# Patient Record
Sex: FEMALE | Race: WHITE | NOT HISPANIC OR LATINO | Employment: FULL TIME | URBAN - METROPOLITAN AREA
[De-identification: names, ages, dates, MRNs, and addresses within clinical notes are randomized per-mention and may not be internally consistent; named-entity substitution may affect disease eponyms.]

---

## 2021-04-16 ENCOUNTER — PATIENT MESSAGE (OUTPATIENT)
Dept: RESEARCH | Facility: HOSPITAL | Age: 46
End: 2021-04-16

## 2022-02-09 ENCOUNTER — TELEPHONE (OUTPATIENT)
Dept: INTERNAL MEDICINE | Facility: CLINIC | Age: 47
End: 2022-02-09
Payer: COMMERCIAL

## 2022-02-09 NOTE — TELEPHONE ENCOUNTER
----- Message from Maegan Kearns sent at 2/9/2022 11:45 AM CST -----  Contact: Patient [phone]  [facility] state that they need a medical form fax to [phone] Attn: Record Department to send patient's records    Please call and advise

## 2022-02-09 NOTE — TELEPHONE ENCOUNTER
I spoke to pt, she have an NP appt scheduled with Dr. Hammonds on 2-16-22 and she is requesting records from [facility] to be sent prior to her visit.  I will fax pt record release form  to fill out.  Fax form to [phone]  Pt verbalized understanding

## 2022-02-14 LAB
LEFT EYE DM RETINOPATHY: NORMAL
RIGHT EYE DM RETINOPATHY: NORMAL

## 2022-02-15 ENCOUNTER — TELEPHONE (OUTPATIENT)
Dept: INTERNAL MEDICINE | Facility: CLINIC | Age: 47
End: 2022-02-15
Payer: COMMERCIAL

## 2022-02-15 NOTE — TELEPHONE ENCOUNTER
----- Message from Jenny Andrew sent at 2/15/2022  9:00 AM CST -----  Contact: Pt [phone]  Pt calling to see if you received her paper work from [facility] before her appt tomorrow.     Please call and advise

## 2022-02-15 NOTE — TELEPHONE ENCOUNTER
I spoke to pt, her records from [facility] are in.  Pt will keep her appt scheduled tomorrow at 1pm.

## 2022-02-16 ENCOUNTER — OFFICE VISIT (OUTPATIENT)
Dept: INTERNAL MEDICINE | Facility: CLINIC | Age: 47
End: 2022-02-16
Payer: COMMERCIAL

## 2022-02-16 ENCOUNTER — TELEPHONE (OUTPATIENT)
Dept: DIABETES | Facility: CLINIC | Age: 47
End: 2022-02-16
Payer: COMMERCIAL

## 2022-02-16 VITALS
BODY MASS INDEX: 37.63 KG/M2 | DIASTOLIC BLOOD PRESSURE: 66 MMHG | WEIGHT: 199.31 LBS | TEMPERATURE: 98 F | HEIGHT: 61 IN | OXYGEN SATURATION: 98 % | HEART RATE: 88 BPM | RESPIRATION RATE: 16 BRPM | SYSTOLIC BLOOD PRESSURE: 96 MMHG

## 2022-02-16 DIAGNOSIS — E11.9 TYPE 2 DIABETES MELLITUS WITHOUT COMPLICATION, WITHOUT LONG-TERM CURRENT USE OF INSULIN: ICD-10-CM

## 2022-02-16 DIAGNOSIS — Z00.00 PHYSICAL EXAM, ANNUAL: Primary | ICD-10-CM

## 2022-02-16 DIAGNOSIS — E03.9 HYPOTHYROIDISM, UNSPECIFIED TYPE: ICD-10-CM

## 2022-02-16 DIAGNOSIS — I10 HYPERTENSION, UNSPECIFIED TYPE: ICD-10-CM

## 2022-02-16 PROCEDURE — 90471 PNEUMOCOCCAL POLYSACCHARIDE VACCINE 23-VALENT =>2YO SQ IM: ICD-10-PCS | Mod: S$GLB,,, | Performed by: STUDENT IN AN ORGANIZED HEALTH CARE EDUCATION/TRAINING PROGRAM

## 2022-02-16 PROCEDURE — 90471 IMMUNIZATION ADMIN: CPT | Mod: S$GLB,,, | Performed by: STUDENT IN AN ORGANIZED HEALTH CARE EDUCATION/TRAINING PROGRAM

## 2022-02-16 PROCEDURE — 4010F ACE/ARB THERAPY RXD/TAKEN: CPT | Mod: CPTII,S$GLB,, | Performed by: STUDENT IN AN ORGANIZED HEALTH CARE EDUCATION/TRAINING PROGRAM

## 2022-02-16 PROCEDURE — 1159F PR MEDICATION LIST DOCUMENTED IN MEDICAL RECORD: ICD-10-PCS | Mod: CPTII,S$GLB,, | Performed by: STUDENT IN AN ORGANIZED HEALTH CARE EDUCATION/TRAINING PROGRAM

## 2022-02-16 PROCEDURE — 3078F PR MOST RECENT DIASTOLIC BLOOD PRESSURE < 80 MM HG: ICD-10-PCS | Mod: CPTII,S$GLB,, | Performed by: STUDENT IN AN ORGANIZED HEALTH CARE EDUCATION/TRAINING PROGRAM

## 2022-02-16 PROCEDURE — 3074F SYST BP LT 130 MM HG: CPT | Mod: CPTII,S$GLB,, | Performed by: STUDENT IN AN ORGANIZED HEALTH CARE EDUCATION/TRAINING PROGRAM

## 2022-02-16 PROCEDURE — 99999 PR PBB SHADOW E&M-EST. PATIENT-LVL V: CPT | Mod: PBBFAC,,, | Performed by: STUDENT IN AN ORGANIZED HEALTH CARE EDUCATION/TRAINING PROGRAM

## 2022-02-16 PROCEDURE — 4010F PR ACE/ARB THEARPY RXD/TAKEN: ICD-10-PCS | Mod: CPTII,S$GLB,, | Performed by: STUDENT IN AN ORGANIZED HEALTH CARE EDUCATION/TRAINING PROGRAM

## 2022-02-16 PROCEDURE — 3008F PR BODY MASS INDEX (BMI) DOCUMENTED: ICD-10-PCS | Mod: CPTII,S$GLB,, | Performed by: STUDENT IN AN ORGANIZED HEALTH CARE EDUCATION/TRAINING PROGRAM

## 2022-02-16 PROCEDURE — 99386 PR PREVENTIVE VISIT,NEW,40-64: ICD-10-PCS | Mod: 25,S$GLB,, | Performed by: STUDENT IN AN ORGANIZED HEALTH CARE EDUCATION/TRAINING PROGRAM

## 2022-02-16 PROCEDURE — 90732 PPSV23 VACC 2 YRS+ SUBQ/IM: CPT | Mod: S$GLB,,, | Performed by: STUDENT IN AN ORGANIZED HEALTH CARE EDUCATION/TRAINING PROGRAM

## 2022-02-16 PROCEDURE — 1159F MED LIST DOCD IN RCRD: CPT | Mod: CPTII,S$GLB,, | Performed by: STUDENT IN AN ORGANIZED HEALTH CARE EDUCATION/TRAINING PROGRAM

## 2022-02-16 PROCEDURE — 99999 PR PBB SHADOW E&M-EST. PATIENT-LVL V: ICD-10-PCS | Mod: PBBFAC,,, | Performed by: STUDENT IN AN ORGANIZED HEALTH CARE EDUCATION/TRAINING PROGRAM

## 2022-02-16 PROCEDURE — 3078F DIAST BP <80 MM HG: CPT | Mod: CPTII,S$GLB,, | Performed by: STUDENT IN AN ORGANIZED HEALTH CARE EDUCATION/TRAINING PROGRAM

## 2022-02-16 PROCEDURE — 3074F PR MOST RECENT SYSTOLIC BLOOD PRESSURE < 130 MM HG: ICD-10-PCS | Mod: CPTII,S$GLB,, | Performed by: STUDENT IN AN ORGANIZED HEALTH CARE EDUCATION/TRAINING PROGRAM

## 2022-02-16 PROCEDURE — 90732 PNEUMOCOCCAL POLYSACCHARIDE VACCINE 23-VALENT =>2YO SQ IM: ICD-10-PCS | Mod: S$GLB,,, | Performed by: STUDENT IN AN ORGANIZED HEALTH CARE EDUCATION/TRAINING PROGRAM

## 2022-02-16 PROCEDURE — 3008F BODY MASS INDEX DOCD: CPT | Mod: CPTII,S$GLB,, | Performed by: STUDENT IN AN ORGANIZED HEALTH CARE EDUCATION/TRAINING PROGRAM

## 2022-02-16 PROCEDURE — 99386 PREV VISIT NEW AGE 40-64: CPT | Mod: 25,S$GLB,, | Performed by: STUDENT IN AN ORGANIZED HEALTH CARE EDUCATION/TRAINING PROGRAM

## 2022-02-16 RX ORDER — EMPAGLIFLOZIN 25 MG/1
25 TABLET, FILM COATED ORAL ONCE
COMMUNITY
Start: 2022-02-04 | End: 2022-04-22 | Stop reason: SDUPTHER

## 2022-02-16 RX ORDER — LEVOTHYROXINE SODIUM 50 UG/1
50 TABLET ORAL DAILY
Qty: 90 TABLET | Refills: 1 | Status: SHIPPED | OUTPATIENT
Start: 2022-02-16 | End: 2022-08-16 | Stop reason: SDUPTHER

## 2022-02-16 RX ORDER — SPIRONOLACTONE 25 MG/1
12.5 TABLET ORAL DAILY
COMMUNITY
Start: 2021-11-17 | End: 2022-08-16 | Stop reason: SDUPTHER

## 2022-02-16 RX ORDER — GLIMEPIRIDE 2 MG/1
2 TABLET ORAL 3 TIMES DAILY
COMMUNITY
Start: 2021-12-13 | End: 2022-02-16 | Stop reason: SDUPTHER

## 2022-02-16 RX ORDER — GLIMEPIRIDE 4 MG/1
4 TABLET ORAL
Qty: 30 TABLET | Refills: 3 | Status: SHIPPED | OUTPATIENT
Start: 2022-02-16 | End: 2022-05-19

## 2022-02-16 RX ORDER — ATORVASTATIN CALCIUM 40 MG/1
40 TABLET, FILM COATED ORAL DAILY
COMMUNITY
Start: 2021-12-22 | End: 2022-06-20 | Stop reason: SDUPTHER

## 2022-02-16 RX ORDER — METOPROLOL SUCCINATE 25 MG/1
25 TABLET, EXTENDED RELEASE ORAL DAILY
COMMUNITY
Start: 2021-11-17 | End: 2022-02-24 | Stop reason: SDUPTHER

## 2022-02-16 RX ORDER — CALCIUM CITRATE/VITAMIN D3 200MG-6.25
TABLET ORAL 4 TIMES DAILY
COMMUNITY
Start: 2022-02-11 | End: 2022-05-16 | Stop reason: SDUPTHER

## 2022-02-16 NOTE — PROGRESS NOTES
Subjective:     Chief Complaint: Diabetes, Establish Care, Annual Exam, and Hypertension    HPI   Ms. Nahed Mcginnis is a 47 yo F presenting as a new patient to establish/transition primary care; of note, all prior care has taking place at  (Dr. Haydee galo)    Outside medical records received in preparation for this appointment in summarized as below:  -normal mammography () 08/31/2020  -normal chest x-ray 01/07/2020  -labs 5/18 showed total cholesterol of 233, triglycerides of 194, and LDL of 148, fasting blood sugar elevation (142)  and modest platelet count elevation (429); otherwise normal cell lines and kidney/liver function  - normal Pap smear 08/17/2020  - 03/10/2017:  Chest x-ray gathered with diagnosis of cough showed new nodular density left lung base with differential of super position artifact, inflammatory nodule, atelectasis, neoplasm, short-term follow-up chest x-ray is recommended.  If findings are persistent, further evaluation with CT would be suggested.  3/17:  Normal CT abdomen and pelvis    Diabetes:  -Currently taking:  Januvia 50, Jardiance 25 (started back on it 3 weeks ago), glimepiride 2 mg t.i.d. (?...almost out)   -Describing regular episodes hypoglycemia  -Most recent A1c: ?8ish?  -Takes  atorvastatin 40  -Denies diabetic neuropathy  -Most recent microalbumin:  Does not report ever having urine studies assessed  -Pneumovax:  Never offered  -Optho exam:  Has been seeing an ophthalmologist for contact lenses; does not report having ever having had a dilated eye exam  -Last foot exam:  Never    Hypertension:  - hydrochlorothiazide 12.5, Spironolactone 25, amlodipine 5, lisinopril 10, metoprolol succinate 25  - experiencing significant BP increases     Hypothyroidism:  - levothyroxine 50 (recently increased)    Family, social, surgical Hx reviewed     Health Maintenance:  Mammogram:  Overdue for repeat  Gyn exam:  Follows with outside gyn ([facility])  Colorectal CA screening: no  indication for early initiation.   Cholesterol:  Due for assessment  Vaccines:  Due for pneumovax 23 (diabetes)  Eye exam:  Due as above  Diet and Exercise: Discussed current recommendations     Past Medical History:   Diagnosis Date    DM (diabetes mellitus)     Hypertension     Hypothyroidism      Past Surgical History:   Procedure Laterality Date     SECTION      DILATION AND CURETTAGE OF UTERUS      TONSILLECTOMY       Family History   Problem Relation Age of Onset    Hypertension Mother     Heart disease Father      Social History     Socioeconomic History    Marital status:    Occupational History    Occupation: clerical     Employer: [employer]   Tobacco Use    Smoking status: Never Smoker    Smokeless tobacco: Never Used   Substance and Sexual Activity    Alcohol use: No    Drug use: No    Sexual activity: Yes     Partners: Male     Birth control/protection: OCP     Review of patient's allergies indicates:  No Known Allergies  Nahed Mcginnis had no medications administered during this visit.      Review of Systems   Constitutional: Negative for appetite change, chills and fever.   HENT: Negative.    Respiratory: Negative for cough, chest tightness and shortness of breath.    Cardiovascular: Negative for chest pain, palpitations and leg swelling.   Gastrointestinal: Negative for abdominal distention, abdominal pain, blood in stool, constipation, diarrhea, nausea and vomiting.   Endocrine: Negative.    Genitourinary: Negative for difficulty urinating, dysuria, frequency and hematuria.   Musculoskeletal: Negative.    Integumentary:  Negative.   Neurological: Negative.    Psychiatric/Behavioral: Negative.          Objective:      Vitals:    22 1301   BP: 96/66   Pulse: 88   Resp: 16   Temp: 97.5 °F (36.4 °C)      Physical Exam  Vitals reviewed.   Constitutional:       General: She is not in acute distress.     Appearance: Normal appearance.   HENT:      Head:  Normocephalic and atraumatic.      Comments: Facial features are symmetric      Nose: Nose normal. No congestion or rhinorrhea.      Mouth/Throat:      Mouth: Mucous membranes are moist.      Pharynx: Oropharynx is clear. No oropharyngeal exudate or posterior oropharyngeal erythema.   Eyes:      General: No scleral icterus.     Extraocular Movements: Extraocular movements intact.      Conjunctiva/sclera: Conjunctivae normal.   Cardiovascular:      Rate and Rhythm: Normal rate and regular rhythm.      Pulses: Normal pulses.      Heart sounds: Normal heart sounds.   Pulmonary:      Effort: Pulmonary effort is normal. No respiratory distress.      Breath sounds: Normal breath sounds.   Musculoskeletal:         General: No deformity or signs of injury. Normal range of motion.      Cervical back: Normal range of motion.      Comments: Gait normal    Skin:     General: Skin is warm and dry.      Findings: No rash.   Neurological:      General: No focal deficit present.      Mental Status: She is alert and oriented to person, place, and time. Mental status is at baseline.   Psychiatric:         Mood and Affect: Mood normal.         Behavior: Behavior normal.         Thought Content: Thought content normal.       Current Outpatient Medications on File Prior to Visit   Medication Sig Dispense Refill    amlodipine besylate (AMLODIPINE ORAL) Take 5 mg by mouth once daily.      atorvastatin (LIPITOR) 40 MG tablet Take 40 mg by mouth once daily.      hydrochlorothiazide (HYDRODIURIL) 12.5 MG Tab Take 12.5 mg by mouth once daily.      JARDIANCE 25 mg tablet Take 25 mg by mouth once.      lisinopril 10 MG tablet Take 20 mg by mouth once daily.      metoprolol succinate (TOPROL-XL) 25 MG 24 hr tablet Take 25 mg by mouth once daily.      omeprazole (PRILOSEC) 20 MG capsule Take 20 mg by mouth once daily.      SITagliptin (JANUVIA) 50 MG Tab Take 100 mg by mouth once daily.      spironolactone (ALDACTONE) 25 MG tablet Take  12.5 mg by mouth once daily.      TRUE METRIX GLUCOSE TEST STRIP Strp 4 (four) times daily.      [DISCONTINUED] glimepiride (AMARYL) 2 MG tablet Take 2 mg by mouth 3 (three) times daily. 2 tablets in the morning and 1 tablet in the evening      [DISCONTINUED] levothyroxine (SYNTHROID) 50 MCG tablet Take by mouth once daily.      acetaminophen (TYLENOL) 500 MG tablet Take 2 tablets (1,000 mg total) by mouth every 6 (six) hours as needed. (Patient not taking: Reported on 2/16/2022) 50 tablet 0    diclofenac sodium (VOLTAREN) 1 % Gel Apply 2 g topically 4 (four) times daily. (Patient not taking: Reported on 2/16/2022) 100 g 0    HYDROcodone-acetaminophen (NORCO) 7.5-325 mg per tablet Take 1 tablet by mouth every 8 (eight) hours as needed. 15 tablet 0    [DISCONTINUED] metoprolol succinate (TOPROL-XL) 100 MG 24 hr tablet Take 100 mg by mouth once daily.      [DISCONTINUED] ondansetron (ZOFRAN) 4 MG tablet Take 1 tablet (4 mg total) by mouth every 6 (six) hours. (Patient not taking: No sig reported) 12 tablet 0    [DISCONTINUED] pantoprazole (PROTONIX) 20 MG tablet Take 1 tablet (20 mg total) by mouth once daily. (Patient not taking: No sig reported) 30 tablet 0    [DISCONTINUED] promethazine (PHENERGAN) 25 MG suppository Place 1 suppository (25 mg total) rectally every 6 (six) hours as needed for Nausea. 10 suppository 0    [DISCONTINUED] SPIRONOLACTONE, BULK, MISC by Misc.(Non-Drug; Combo Route) route.      [DISCONTINUED] sucralfate (CARAFATE) 100 mg/mL suspension Take 10 mLs (1 g total) by mouth 4 (four) times daily before meals and nightly. 414 mL 0     No current facility-administered medications on file prior to visit.         Assessment:       1. Physical exam, annual    2. Type 2 diabetes mellitus without complication, without long-term current use of insulin    3. Hypertension, unspecified type    4. Hypothyroidism, unspecified type        Plan:       Physical exam, annual  -     CBC Auto  Differential; Future; Expected date: 02/16/2022  -     Comprehensive Metabolic Panel; Future; Expected date: 02/16/2022  -     Hemoglobin A1C; Future; Expected date: 02/16/2022  -     Hepatitis C Antibody; Future; Expected date: 02/16/2022  -     HIV 1/2 Ag/Ab (4th Gen); Future; Expected date: 02/16/2022  -     Lipid Panel; Future; Expected date: 02/16/2022  -     Mammo Digital Screening Bilat w/ Dominguez; Future; Expected date: 02/16/2022        -     TSH; Future; Expected date: 02/16/2022   - annual screening labs ordered   - vaccinations preferentially updated with PPVS 23    Type 2 diabetes mellitus without complication, without long-term current use of insulin  -     C-Peptide; Future; Expected date: 02/16/2022  -     Glutamic acid decarboxylase; Future; Expected date: 02/16/2022  -     Microalbumin/Creatinine Ratio, Urine; Future; Expected date: 02/16/2022  -     Ambulatory referral/consult to Diabetic Advanced Practice Providers (Medical Management); Future; Expected date: 02/23/2022  -     Ambulatory referral/consult to Diabetes Education; Future; Expected date: 02/23/2022   - I am perplexed by her current diabetic regimen, namely glimepiride 2 mg t.i.d. in the setting of recurrent hypoglycemia; I have changed this to 4 mg once daily with breakfast   - will facilitate establishment with both in office diabetic specialist & diabetic educator in addition to confirming type 2 diabetic status (C-peptide and SAVANNAH antibody); recommendations/interventions appreciated   - A1c and microalbumin to creatinine ratio to be gathered with annual labs as above; as she has recently re-initiated Jardiance 25 and will be decreasing total glimepiride dose from 6-4 mg daily, will reassess A1c in 3 months    Hypertension, unspecified type   - equally perplexed by antihypertensive regimen:  Will eliminate hydrochlorothiazide and amlodipine   - will continue with metoprolol and lisinopril monotherapy (preferentially increasing  lisinopril for nephroprotection)    Hypothyroidism, unspecified type   - she was never told to take this medication 1st thing in the morning on empty stomach; extensive conversation had regarding such   - will reassess TSH treat accordingly    Other orders  -     Pneumococcal Polysaccharide Vaccine (23 Valent) (SQ/IM)  -     levothyroxine (SYNTHROID) 50 MCG tablet; Take 1 tablet (50 mcg total) by mouth once daily.  Dispense: 90 tablet; Refill: 1  -     glimepiride (AMARYL) 4 MG tablet; Take 1 tablet (4 mg total) by mouth daily with breakfast. 2 tablets in the morning and 1 tablet in the evening  Dispense: 30 tablet; Refill: 3

## 2022-02-17 ENCOUNTER — TELEPHONE (OUTPATIENT)
Dept: INTERNAL MEDICINE | Facility: CLINIC | Age: 47
End: 2022-02-17
Payer: COMMERCIAL

## 2022-02-17 NOTE — TELEPHONE ENCOUNTER
----- Message from Anibal Garcia sent at 2/17/2022  2:26 PM CST -----  Contact: [phone]  Pt is calling about RX glimepiride (AMARYL) 4 MG tablet, she said the pharmacy called her and said the directions on the rx are incorrect, please call pt back, thanks     [id number] - [place] - [address] AT [facility] [address]  [address] [address]  Phone: [phone] Fax: [phone]

## 2022-02-24 ENCOUNTER — PATIENT MESSAGE (OUTPATIENT)
Dept: INTERNAL MEDICINE | Facility: CLINIC | Age: 47
End: 2022-02-24
Payer: COMMERCIAL

## 2022-02-24 RX ORDER — METOPROLOL SUCCINATE 25 MG/1
25 TABLET, EXTENDED RELEASE ORAL DAILY
Qty: 90 TABLET | Refills: 4 | Status: SHIPPED | OUTPATIENT
Start: 2022-02-24 | End: 2023-04-03 | Stop reason: SDUPTHER

## 2022-02-24 NOTE — TELEPHONE ENCOUNTER
----- Message from Paola Gupta sent at 2/24/2022 10:10 AM CST -----  Contact: Pt- [phone]  Requesting an RX refill or new RX.    Is this a refill or new RX: refill    RX name and strength (copy/paste from chart):  metoprolol succinate (TOPROL-XL) 25 MG 24 hr tablet    Is this a 30 day or 90 day RX: 30 day    Pharmacy name and phone # (copy/paste from chart):    [id number] - [place] - [address] [facility] [address]  [address] [address]  Phone: [phone] Fax: [phone]    The doctors have asked that we provide their patients with the following 2 reminders -- prescription refills can take up to 72 hours, and a friendly reminder that in the future you can use your [facility] account to request refills:  yes

## 2022-02-24 NOTE — TELEPHONE ENCOUNTER
Care Due:                  Date            Visit Type   Department     Provider  --------------------------------------------------------------------------------                                NP -                              PRIMARY      MET INTERNAL  Last Visit: 02-      CARE ([facility])   MEDICINE       Fausto Hammonds  Next Visit: None Scheduled  None         None Found                                                            Last  Test          Frequency    Reason                     Performed    Due Date  --------------------------------------------------------------------------------    Cr..........  12 months..  glimepiride..............  04- 04-    HBA1C.......  6 months...  glimepiride..............  Not Found    Overdue    TSH.........  12 months..  levothyroxine............  Not Found    Overdue    Powered by [employer]. Reference number: [id number].   2/24/2022 11:40:11 AM CST

## 2022-02-28 ENCOUNTER — TELEPHONE (OUTPATIENT)
Dept: DIABETES | Facility: CLINIC | Age: 47
End: 2022-02-28
Payer: COMMERCIAL

## 2022-03-10 ENCOUNTER — TELEPHONE (OUTPATIENT)
Dept: INTERNAL MEDICINE | Facility: CLINIC | Age: 47
End: 2022-03-10
Payer: COMMERCIAL

## 2022-03-10 NOTE — TELEPHONE ENCOUNTER
I spoke to pt, there was a bit of confusion, the diabetic educator told pt to contact PCP regarding changing of diabetic testing supplies.  Pt is schedule to see NP Khushbu on 5-19-22.  Pt was advised NP Khushbu will determine the best testing supplies needed and her staff will instruct pt on using this equipment.  Pt verbalized understanding.

## 2022-03-10 NOTE — TELEPHONE ENCOUNTER
----- Message from Karena Phelps sent at 3/10/2022  1:40 PM CST -----  Type: Patient Call Back         Who called:Patient          What is the request in detail:Patient called in regarding a message from the benito eduction .         Can the clinic reply by [facility]?no          Would the patient rather a call back or a response via [facility]? Call back          Best call back number:. [phone]         Additional Information:           Thank You

## 2022-03-22 ENCOUNTER — TELEPHONE (OUTPATIENT)
Dept: INTERNAL MEDICINE | Facility: CLINIC | Age: 47
End: 2022-03-22
Payer: COMMERCIAL

## 2022-03-22 ENCOUNTER — PATIENT MESSAGE (OUTPATIENT)
Dept: INTERNAL MEDICINE | Facility: CLINIC | Age: 47
End: 2022-03-22
Payer: COMMERCIAL

## 2022-03-22 NOTE — TELEPHONE ENCOUNTER
I spoke to pt, she is reporting redness to opposite eye. No pain, discomfort or itching noted.  Pt wears contact lens.She was advised to contact her eye dr.  Pt verbalized understanding

## 2022-03-22 NOTE — TELEPHONE ENCOUNTER
I tried calling pt to discuss mychart message sent .  No answer. Left VM for pt to call the clinic

## 2022-03-22 NOTE — TELEPHONE ENCOUNTER
----- Message from Jenny Andrew sent at 3/22/2022  3:46 PM CDT -----  Contact: Pt [phone]  Patient is returning a phone call.  Who left a message for the patient: Dominique Liriano,  Does patient know what this is regarding:  Portal message   Would you like a call back, or a response through your [facility] portal?:   call back   Comments:

## 2022-04-21 ENCOUNTER — PATIENT MESSAGE (OUTPATIENT)
Dept: INTERNAL MEDICINE | Facility: CLINIC | Age: 47
End: 2022-04-21
Payer: COMMERCIAL

## 2022-04-21 NOTE — TELEPHONE ENCOUNTER
Last OV 2-16-22    Pt requested Rx Jardiance, I tried calling pt to discuss her request. No answer. Left VM for pt to call the clinic tomorrow

## 2022-04-22 RX ORDER — EMPAGLIFLOZIN 25 MG/1
25 TABLET, FILM COATED ORAL ONCE
Qty: 30 TABLET | Refills: 2 | Status: SHIPPED | OUTPATIENT
Start: 2022-04-22 | End: 2022-04-22

## 2022-04-22 NOTE — TELEPHONE ENCOUNTER
I spoke to pt and she verified she is taking several Rx's for diabetes management.  She is requesting a 30 day refill of Jardiance 25 mg below.  Pt is scheduled to see SAVANAH Ríos on 5-19-22    Thanks    
No new care gaps identified.  Powered by [employer]. Reference number: [id number].   4/22/2022 9:31:26 AM CDT  
(0) independent

## 2022-05-04 ENCOUNTER — PATIENT MESSAGE (OUTPATIENT)
Dept: INTERNAL MEDICINE | Facility: CLINIC | Age: 47
End: 2022-05-04
Payer: COMMERCIAL

## 2022-05-04 DIAGNOSIS — E11.9 TYPE 2 DIABETES MELLITUS WITHOUT COMPLICATION, WITHOUT LONG-TERM CURRENT USE OF INSULIN: Primary | ICD-10-CM

## 2022-05-04 RX ORDER — EMPAGLIFLOZIN 25 MG/1
25 TABLET, FILM COATED ORAL DAILY
COMMUNITY
Start: 2022-04-22 | End: 2022-05-19

## 2022-05-04 NOTE — TELEPHONE ENCOUNTER
I spoke to pt, she is taking 3 meds for diabetes.  I updated her list. She is scheduled to see NP Khushbu on 5-19-22. Pt was advised to bring her med list/Rx bottles to her appt.    Pt is requesting the Rx below, thanks

## 2022-05-04 NOTE — TELEPHONE ENCOUNTER
Care Due:                  Date            Visit Type   Department     Provider  --------------------------------------------------------------------------------                                NP -                              PRIMARY      MET INTERNAL  Last Visit: 02-      CARE ([facility])   MEDICINE       Fausto Hammonds  Next Visit: None Scheduled  None         None Found                                                            Last  Test          Frequency    Reason                     Performed    Due Date  --------------------------------------------------------------------------------    Cr..........  12 months..  glimepiride..............  04- 04-    HBA1C.......  6 months...  glimepiride..............  Not Found    Overdue    TSH.........  12 months..  levothyroxine............  Not Found    Overdue    [employer] Embedded Care Gaps. Reference number: [id number]. 5/04/2022   8:43:45 AM CDT

## 2022-05-11 ENCOUNTER — PATIENT MESSAGE (OUTPATIENT)
Dept: INTERNAL MEDICINE | Facility: CLINIC | Age: 47
End: 2022-05-11
Payer: COMMERCIAL

## 2022-05-11 RX ORDER — ONDANSETRON 4 MG/1
8 TABLET, ORALLY DISINTEGRATING ORAL 2 TIMES DAILY PRN
COMMUNITY
End: 2022-05-11 | Stop reason: SDUPTHER

## 2022-05-11 RX ORDER — ONDANSETRON 4 MG/1
4 TABLET, ORALLY DISINTEGRATING ORAL 2 TIMES DAILY PRN
Qty: 60 TABLET | Refills: 1 | Status: SHIPPED | OUTPATIENT
Start: 2022-05-11

## 2022-05-11 NOTE — TELEPHONE ENCOUNTER
No new care gaps identified.  [facility] Embedded Care Gaps. Reference number: [id number]. 5/11/2022   11:37:19 AM CDT

## 2022-05-11 NOTE — TELEPHONE ENCOUNTER
Last OV 22    I spoke to pt, she is reporting nausea off and on for 1 week.  She tried taking tums and antacid with no relief.  Pt had a Rx for zofran that have .    Pt is requesting Rx refill, thanks

## 2022-05-16 ENCOUNTER — PATIENT MESSAGE (OUTPATIENT)
Dept: INTERNAL MEDICINE | Facility: CLINIC | Age: 47
End: 2022-05-16
Payer: COMMERCIAL

## 2022-05-16 ENCOUNTER — LAB VISIT (OUTPATIENT)
Dept: LAB | Facility: HOSPITAL | Age: 47
End: 2022-05-16
Attending: STUDENT IN AN ORGANIZED HEALTH CARE EDUCATION/TRAINING PROGRAM
Payer: COMMERCIAL

## 2022-05-16 ENCOUNTER — HOSPITAL ENCOUNTER (OUTPATIENT)
Dept: RADIOLOGY | Facility: HOSPITAL | Age: 47
Discharge: HOME | End: 2022-05-16
Attending: STUDENT IN AN ORGANIZED HEALTH CARE EDUCATION/TRAINING PROGRAM
Payer: COMMERCIAL

## 2022-05-16 VITALS — BODY MASS INDEX: 37.57 KG/M2 | WEIGHT: 199 LBS | HEIGHT: 61 IN

## 2022-05-16 DIAGNOSIS — Z00.00 PHYSICAL EXAM, ANNUAL: ICD-10-CM

## 2022-05-16 DIAGNOSIS — E11.9 TYPE 2 DIABETES MELLITUS WITHOUT COMPLICATION, WITHOUT LONG-TERM CURRENT USE OF INSULIN: Primary | ICD-10-CM

## 2022-05-16 LAB
ALBUMIN/CREAT UR: 4.3 UG/MG (ref 0–30)
CREAT UR-MCNC: 140 MG/DL (ref 15–325)
MICROALBUMIN UR DL<=1MG/L-MCNC: 6 UG/ML

## 2022-05-16 PROCEDURE — 77063 BREAST TOMOSYNTHESIS BI: CPT | Mod: TC,PO

## 2022-05-16 PROCEDURE — 77063 MAMMO DIGITAL SCREENING BILAT WITH TOMO: ICD-10-PCS | Mod: 26,,, | Performed by: RADIOLOGY

## 2022-05-16 PROCEDURE — 77067 MAMMO DIGITAL SCREENING BILAT WITH TOMO: ICD-10-PCS | Mod: 26,,, | Performed by: RADIOLOGY

## 2022-05-16 PROCEDURE — 82043 UR ALBUMIN QUANTITATIVE: CPT | Performed by: STUDENT IN AN ORGANIZED HEALTH CARE EDUCATION/TRAINING PROGRAM

## 2022-05-16 PROCEDURE — 77063 BREAST TOMOSYNTHESIS BI: CPT | Mod: 26,,, | Performed by: RADIOLOGY

## 2022-05-16 PROCEDURE — 77067 SCR MAMMO BI INCL CAD: CPT | Mod: 26,,, | Performed by: RADIOLOGY

## 2022-05-16 PROCEDURE — 82570 ASSAY OF URINE CREATININE: CPT | Performed by: STUDENT IN AN ORGANIZED HEALTH CARE EDUCATION/TRAINING PROGRAM

## 2022-05-16 RX ORDER — CALCIUM CITRATE/VITAMIN D3 200MG-6.25
TABLET ORAL
Qty: 200 EACH | Refills: 11 | Status: SHIPPED | OUTPATIENT
Start: 2022-05-16

## 2022-05-16 NOTE — TELEPHONE ENCOUNTER
No new care gaps identified.  [facility] Embedded Care Gaps. Reference number: [id number]. 5/16/2022   9:22:35 AM CDT

## 2022-05-19 ENCOUNTER — CLINICAL SUPPORT (OUTPATIENT)
Dept: DIABETES | Facility: CLINIC | Age: 47
End: 2022-05-19
Payer: COMMERCIAL

## 2022-05-19 ENCOUNTER — OFFICE VISIT (OUTPATIENT)
Dept: INTERNAL MEDICINE | Facility: CLINIC | Age: 47
End: 2022-05-19
Payer: COMMERCIAL

## 2022-05-19 VITALS
WEIGHT: 201.5 LBS | SYSTOLIC BLOOD PRESSURE: 124 MMHG | TEMPERATURE: 97 F | DIASTOLIC BLOOD PRESSURE: 80 MMHG | HEIGHT: 62 IN | RESPIRATION RATE: 14 BRPM | BODY MASS INDEX: 37.08 KG/M2 | HEART RATE: 84 BPM | OXYGEN SATURATION: 98 %

## 2022-05-19 DIAGNOSIS — E03.9 HYPOTHYROIDISM, UNSPECIFIED TYPE: ICD-10-CM

## 2022-05-19 DIAGNOSIS — E11.9 TYPE 2 DIABETES MELLITUS WITHOUT COMPLICATION, WITHOUT LONG-TERM CURRENT USE OF INSULIN: ICD-10-CM

## 2022-05-19 DIAGNOSIS — E11.9 TYPE 2 DIABETES MELLITUS WITHOUT COMPLICATION, WITHOUT LONG-TERM CURRENT USE OF INSULIN: Primary | ICD-10-CM

## 2022-05-19 DIAGNOSIS — I10 HYPERTENSION, UNSPECIFIED TYPE: Chronic | ICD-10-CM

## 2022-05-19 DIAGNOSIS — Z00.00 PHYSICAL EXAM, ANNUAL: ICD-10-CM

## 2022-05-19 DIAGNOSIS — E66.01 SEVERE OBESITY (BMI 35.0-39.9) WITH COMORBIDITY: ICD-10-CM

## 2022-05-19 PROCEDURE — 3079F PR MOST RECENT DIASTOLIC BLOOD PRESSURE 80-89 MM HG: ICD-10-PCS | Mod: CPTII,S$GLB,, | Performed by: NURSE PRACTITIONER

## 2022-05-19 PROCEDURE — 99999 PR PBB SHADOW E&M-EST. PATIENT-LVL V: CPT | Mod: PBBFAC,,, | Performed by: NURSE PRACTITIONER

## 2022-05-19 PROCEDURE — 3074F SYST BP LT 130 MM HG: CPT | Mod: CPTII,S$GLB,, | Performed by: NURSE PRACTITIONER

## 2022-05-19 PROCEDURE — 4010F PR ACE/ARB THEARPY RXD/TAKEN: ICD-10-PCS | Mod: CPTII,S$GLB,, | Performed by: NURSE PRACTITIONER

## 2022-05-19 PROCEDURE — 99999 PR PBB SHADOW E&M-EST. PATIENT-LVL V: ICD-10-PCS | Mod: PBBFAC,,, | Performed by: NURSE PRACTITIONER

## 2022-05-19 PROCEDURE — 3008F BODY MASS INDEX DOCD: CPT | Mod: CPTII,S$GLB,, | Performed by: NURSE PRACTITIONER

## 2022-05-19 PROCEDURE — 1160F RVW MEDS BY RX/DR IN RCRD: CPT | Mod: CPTII,S$GLB,, | Performed by: NURSE PRACTITIONER

## 2022-05-19 PROCEDURE — 99215 PR OFFICE/OUTPT VISIT, EST, LEVL V, 40-54 MIN: ICD-10-PCS | Mod: S$GLB,,, | Performed by: NURSE PRACTITIONER

## 2022-05-19 PROCEDURE — 99215 OFFICE O/P EST HI 40 MIN: CPT | Mod: S$GLB,,, | Performed by: NURSE PRACTITIONER

## 2022-05-19 PROCEDURE — 99999 PR PBB SHADOW E&M-EST. PATIENT-LVL II: ICD-10-PCS | Mod: PBBFAC,,, | Performed by: DIETITIAN, REGISTERED

## 2022-05-19 PROCEDURE — G0108 DIAB MANAGE TRN  PER INDIV: HCPCS | Mod: S$GLB,,, | Performed by: DIETITIAN, REGISTERED

## 2022-05-19 PROCEDURE — 1159F MED LIST DOCD IN RCRD: CPT | Mod: CPTII,S$GLB,, | Performed by: NURSE PRACTITIONER

## 2022-05-19 PROCEDURE — 3074F PR MOST RECENT SYSTOLIC BLOOD PRESSURE < 130 MM HG: ICD-10-PCS | Mod: CPTII,S$GLB,, | Performed by: NURSE PRACTITIONER

## 2022-05-19 PROCEDURE — 3008F PR BODY MASS INDEX (BMI) DOCUMENTED: ICD-10-PCS | Mod: CPTII,S$GLB,, | Performed by: NURSE PRACTITIONER

## 2022-05-19 PROCEDURE — 4010F ACE/ARB THERAPY RXD/TAKEN: CPT | Mod: CPTII,S$GLB,, | Performed by: NURSE PRACTITIONER

## 2022-05-19 PROCEDURE — 3051F HG A1C>EQUAL 7.0%<8.0%: CPT | Mod: CPTII,S$GLB,, | Performed by: NURSE PRACTITIONER

## 2022-05-19 PROCEDURE — 1159F PR MEDICATION LIST DOCUMENTED IN MEDICAL RECORD: ICD-10-PCS | Mod: CPTII,S$GLB,, | Performed by: NURSE PRACTITIONER

## 2022-05-19 PROCEDURE — G0108 PR DIAB MANAGE TRN  PER INDIV: ICD-10-PCS | Mod: S$GLB,,, | Performed by: DIETITIAN, REGISTERED

## 2022-05-19 PROCEDURE — 3061F PR NEG MICROALBUMINURIA RESULT DOCUMENTED/REVIEW: ICD-10-PCS | Mod: CPTII,S$GLB,, | Performed by: NURSE PRACTITIONER

## 2022-05-19 PROCEDURE — 3061F NEG MICROALBUMINURIA REV: CPT | Mod: CPTII,S$GLB,, | Performed by: NURSE PRACTITIONER

## 2022-05-19 PROCEDURE — 99999 PR PBB SHADOW E&M-EST. PATIENT-LVL II: CPT | Mod: PBBFAC,,, | Performed by: DIETITIAN, REGISTERED

## 2022-05-19 PROCEDURE — 3079F DIAST BP 80-89 MM HG: CPT | Mod: CPTII,S$GLB,, | Performed by: NURSE PRACTITIONER

## 2022-05-19 PROCEDURE — 3066F NEPHROPATHY DOC TX: CPT | Mod: CPTII,S$GLB,, | Performed by: NURSE PRACTITIONER

## 2022-05-19 PROCEDURE — 1160F PR REVIEW ALL MEDS BY PRESCRIBER/CLIN PHARMACIST DOCUMENTED: ICD-10-PCS | Mod: CPTII,S$GLB,, | Performed by: NURSE PRACTITIONER

## 2022-05-19 PROCEDURE — 3051F PR MOST RECENT HEMOGLOBIN A1C LEVEL 7.0 - < 8.0%: ICD-10-PCS | Mod: CPTII,S$GLB,, | Performed by: NURSE PRACTITIONER

## 2022-05-19 PROCEDURE — 3066F PR DOCUMENTATION OF TREATMENT FOR NEPHROPATHY: ICD-10-PCS | Mod: CPTII,S$GLB,, | Performed by: NURSE PRACTITIONER

## 2022-05-19 RX ORDER — FLUCONAZOLE 150 MG/1
150 TABLET ORAL DAILY
Qty: 9 EACH | Refills: 0 | Status: SHIPPED | OUTPATIENT
Start: 2022-05-19 | End: 2022-05-22

## 2022-05-19 RX ORDER — FLASH GLUCOSE SENSOR
1 KIT MISCELLANEOUS
Qty: 2 KIT | Refills: 11 | Status: SHIPPED | OUTPATIENT
Start: 2022-05-19 | End: 2022-05-19

## 2022-05-19 RX ORDER — TERCONAZOLE 8 MG/G
1 CREAM VAGINAL NIGHTLY
Qty: 20 G | Refills: 0 | Status: SHIPPED | OUTPATIENT
Start: 2022-05-19

## 2022-05-19 RX ORDER — SEMAGLUTIDE 1.34 MG/ML
0.5 INJECTION, SOLUTION SUBCUTANEOUS
Qty: 1 PEN | Refills: 3 | Status: SHIPPED | OUTPATIENT
Start: 2022-05-19 | End: 2022-06-30

## 2022-05-19 NOTE — PROGRESS NOTES
HPI: Nahed Mcginnis is a 46 y.o.  female c/I for visit to address Diabetes Type 2  This is the first time I am seeing her for care, follows with Dr. Fausto Hammonds MD for primary care needs.   Has not seen endocrinology or diabetes specialist in the past.     was diagnosed with T2DM in 2 - 3 years ago    No family history known - father borderline T2dm -  but .   Has never been hospitalized r/t DM.  Denies missing doses of DM medication.    Current a1c is @ 7.5%   Reports being more controlled glucose levels in years past.   Was begun on metformin originally but stopped taking with GI side effects   Was switched to januvia, then had glimiperide added.   Sugars still weren't to goal -   So jardiance was added last year.   She stopped the jardiance after recurrent yeast infections.   However when her recent labs came back, she restarted (a couple weeks ago).   She does check her sugars and has home glucometer -   Would like to use CGM if available to her. Does not have RX yet.      Reports weight gain progressively over several years,   She is very motivated to lose weight -   Goal weight is 140 lbs.   Admits has not been following ADA diet - fast food often and hectic schedule - she is working full time and is a single mother of a 10 year old boy, so doesn't have much time for cooking/meal preparation.     Complications from diabetes and pertinent co-morbidities include:   Negative for DKA.   Has never taken insulin in life.   -negative for diabetic neuropathy.   -negative for nephropathy.   No microalbuminuria.   Eyes:  negative for Diabetic retinopathy   CV: Denies history of MI nor stroke.   CAD: Denies.  Does not take aspirin 81mg tablet daily  BP: has history of HTN  Statin: Taking  ACE/ARB: Taking    Social History     Tobacco Use   Smoking Status Never Smoker   Smokeless Tobacco Never Used      Past medical History:   Past Medical History:   Diagnosis Date    DM (diabetes mellitus)      Hypertension     Hypothyroidism       Family hx:   Family History   Problem Relation Age of Onset    Hypertension Mother     Heart disease Father       Current meds:   Current Outpatient Medications:     atorvastatin (LIPITOR) 40 MG tablet, Take 40 mg by mouth once daily., Disp: , Rfl:     levothyroxine (SYNTHROID) 50 MCG tablet, Take 1 tablet (50 mcg total) by mouth once daily., Disp: 90 tablet, Rfl: 1    lisinopril 10 MG tablet, Take 20 mg by mouth once daily., Disp: , Rfl:     metoprolol succinate (TOPROL-XL) 25 MG 24 hr tablet, Take 1 tablet (25 mg total) by mouth once daily., Disp: 90 tablet, Rfl: 4    omeprazole (PRILOSEC) 20 MG capsule, Take 20 mg by mouth once daily., Disp: , Rfl:     ondansetron (ZOFRAN-ODT) 4 MG TbDL, Take 1 tablet (4 mg total) by mouth 2 (two) times daily as needed., Disp: 60 tablet, Rfl: 1    spironolactone (ALDACTONE) 25 MG tablet, Take 12.5 mg by mouth once daily., Disp: , Rfl:     TRUE METRIX GLUCOSE TEST STRIP Strp, Pt test her blood sugars 4 times a day, Disp: 200 each, Rfl: 11    flash glucose sensor (FREESTYLE JENNY 2 SENSOR) Kit, 1 each by Misc.(Non-Drug; Combo Route) route every 14 (fourteen) days., Disp: 2 kit, Rfl: 11    OZEMPIC 0.25 mg or 0.5 mg(2 mg/1.5 mL) pen injector, Inject 0.25mg under the skin every week for 2 weeks. Then increase to 0.5mg under the skin every week thereafter., Disp: 1 pen, Rfl: 3    terconazole (TERAZOL 3) 0.8 % vaginal cream, Place 1 applicator vaginally every evening., Disp: 20 g, Rfl: 0     Current Diabetes medications:   DPP4 - Januvia  SGLT2 - jardiance - just restarted recently - however has new yeast infection.   Glimepiride 4mg with breakfast.     Medications Tried and Failed:   Metformin - gi side effects.   jardiance - yeast infections.     Social:   Lives at home with: mom and son - 10 years old -   Life changes/stressors currently - single mom, so very hard to work/balance work life.   Diet: following ADA diet   Meals: 3 per  "day and snacks.        Breakfast - burr, english muffin       Lunch - leftovers from dinner - pizza or mcdonalds -        Dinner - cooking at home, eating out.        Snacks - none.         Drinks - coffee with sugar free creamer- coke   Exercise: none.   Activities: working full time. 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     Standards of care:   Eyes: .Most Recent Eye Exam Date: Not Found  Foot exam: : 05/19/2022   Diabetes education: None.    Vital Signs  /80 (BP Location: Right arm, Patient Position: Sitting, BP Method: Large (Manual))   Pulse 84   Temp 97.2 °F (36.2 °C) (Temporal)   Resp 14   Ht 5' 2" (1.575 m)   Wt 91.4 kg (201 lb 8 oz)   SpO2 98%   BMI 36.85 kg/m²     Pertinent Labs:   Hgba1c   Lab Results   Component Value Date    HGBA1C 7.5 (H) 05/16/2022     Lipid panel   Lab Results   Component Value Date    CHOL 154 05/16/2022     Lab Results   Component Value Date    HDL 43 05/16/2022     Lab Results   Component Value Date    LDLCALC 74.8 05/16/2022     Lab Results   Component Value Date    TRIG 181 (H) 05/16/2022     Lab Results   Component Value Date    CHOLHDL 27.9 05/16/2022      CMP  Glucose   Date Value Ref Range Status   05/16/2022 145 (H) 70 - 110 mg/dL Final     BUN   Date Value Ref Range Status   05/16/2022 10 6 - 20 mg/dL Final     Creatinine   Date Value Ref Range Status   05/16/2022 0.9 0.5 - 1.4 mg/dL Final     eGFR if    Date Value Ref Range Status   05/16/2022 >60.0 >60 mL/min/1.73 m^2 Final     eGFR if non    Date Value Ref Range Status   05/16/2022 >60.0 >60 mL/min/1.73 m^2 Final     Comment:     Calculation used to obtain the estimated glomerular filtration  rate (eGFR) is the CKD-EPI equation.        AST   Date Value Ref Range Status   05/16/2022 23 10 - 40 U/L Final     ALT   Date Value Ref Range Status   05/16/2022 22 10 - 44 U/L Final     Microalbumin creatinine ratio:   Lab Results "   Component Value Date    MICALBCREAT 4.3 05/16/2022       Review Of Systems:   Gen: Appetite good, no weight gain or loss, + fatigue, Denies polydipsia.  Skin: Skin is intact and heals well, denies any rashes or hair changes.   EENT: Denies any acute visual disturbances, nor blurred vision.   Resp: Denies SOB or Dyspnea on exertion, denies cough.   Cardiac: Denies chest pain, palpitations, or swelling.   GI: Denies abdominal pain, nausea or vomiting, diarrhea, or constipation.   /GYN: Denies nocturia, nor burning, frequency or pain on urination. Recent yeast infections 2021 - after taking jardiance  MS/Neuro: Denies numbness/ tingling in BLE; Gait steady, speech clear  Psych: Denies drug/ETOH abuse, no hx of depression.  Other systems: negative.    Physical Exam:   GENERAL: Well developed, well nourished in appearance.   PSYCH: AAOx3, appropriate mood and affect, pleasant expression, conversant, appears relaxed, well groomed.   EYES: PERRL, Conjunctiva and corneas clear  NECK: Soft and Supple, trachea midline  CHEST: Even, regular, and unlabored respirations  ABDOMEN: Soft, non-tender, non-distended.   VASCULAR: pedal pulses palpable bilaterally, no edema.  NEURO:  cranial nerves II - XII intact   MUSCULOSKELETAL: Good ROM, steady gait.   SKIN: Skin warm, dry, and intact     Assessment and Plan of Care:     Nahed was seen today for diabetes.    Diagnoses and all orders for this visit:    Type 2 diabetes mellitus without complication, without long-term current use of insulin  -     fluconazole (DIFLUCAN) 150 MG Tab; Take 1 tablet (150 mg total) by mouth once daily. for 3 days  -     OZEMPIC 0.25 mg or 0.5 mg(2 mg/1.5 mL) pen injector; Inject 0.25mg under the skin every week for 2 weeks. Then increase to 0.5mg under the skin every week thereafter.  -     Discontinue: flash glucose sensor (FREESTYLE JENYN 14 DAY SENSOR) Kit; Inject 1 each into the skin every 14 (fourteen) days. Change sensor every 14 days. Apply  topically to skin as directed.  -     Cancel: POCT Glucose, Hand-Held Device    Hypertension, unspecified type    Hypothyroidism, unspecified type    Severe obesity (BMI 35.0-39.9) with comorbidity    Other orders  -     terconazole (TERAZOL 3) 0.8 % vaginal cream; Place 1 applicator vaginally every evening.       1. T2DM with hyperglycemia- Hgba1c goal is 7.5% or less without hypoglycemia - @ 7.5% currently.   discussed DM, progression of disease, long term complications, CV risk factors and tx options.   Advise compliance with ADA diet and encourage exercise  Stop jardiance - yeast infections.   Stop januvia and glimieperide - switch to glp1 -   Start Ozempic 0.25 mg SC once weekly x 2 - 4  weeks.  At week 5 increase to 0.5 mg weekly and stay at this dose.   No known history of pancreatitis or medullary thyroid cancer.   If you experience severe abdominal pain, nausea, or diarrhea - please call me or notify my office immediately.   Diabetes educator for new kalani start.   Sent in rx - she would like to try cgm rather than fingerstick.    Instructed to monitor Blood glucose 2 - 4x/day and bring meter/ log to every clinic visit.     2. HTN- controlled, continue meds as previously prescribed and monitor.   toprol XL   Lisinopril 10mg tablet dially.     3. Lipids- LDL goal < 100. At goal.   Currently on statin therapy- lipitor 40 mg daily.     4. Weight - BMI Body mass index is 36.85 kg/m².   Encourage Ada diet and exercise.   Start glp1.     5. Renal Function - stable, no nephropathy. Continue to monitor.   No urine mac.     6. Hypothyroid - defer to primary. She is on synthroid 50mcg tablet daily.   May need adjustments if she loses weight.          Follow up in 6 weeks OV - virtual recheck

## 2022-05-19 NOTE — PATIENT INSTRUCTIONS
"Stop januvia   Stop jardiance.   Stop glimiperide.     Try ozempic injection once per week instead. -   Start at 0.25mg every week for 2 to 4 weeks.   Rotate the injection sites.   After you are on the is dose, you can increase to 0.5mg every week thereafter    Eat small frequent meals -   Follow diabetic diet.   Eat some carbs.     Check sugar daily - keep a log -   Write down -       Low Carb Snacks & Diabetes friendly foods   Aim for 30 - 40 grams of carbs for 3 meals per day (or 2 meals and  1 - 2 snacks - however you prefer)  Snacks can be 15 - 20 grams of carbs.     Eating small, frequent meals will help you to feel more satisfied, and more full so that you don't over eat or eat the wrong foods later.   Also, maribell meals/snacks allow you to spread carbohydrates evenly, which may stabilize blood sugars.   Below you will find a list of ideas of foods that I like/prefer.   Feel free to give me your ideas and tips if you find good ones too!   Overall - please remember to limit refined sugars such as soft drinks, juices, rices, pastas, breads, cakes.   Look at the back of the label - look at the amount of "carbohydrates", then look at the amount of "fiber" - subtract the amount of fiber from the amount of carbs - this is your "net carb intake".  The more fiber a food has, the better generally, as you get to subtract this from the net carbs.     0-5 gm carb  Crystal Light flavoring (I like fruit punch flavor!)  Vitamin Water Zero  Glory antioxidant drinks.   Sparkling ice (long skinny flavored water bottles for $1 that are sugar free).   Keke water, WaterLoo - carbonated flavored jaime, various flavors.  Diet coke, diet barqs, sprite zero.  Diet sunkist (orange drink)  Sugar free powerade  Herbal tea, unsweetened  2 tsp peanut butter on celery or carrots  Juliann's original Candies - (the Sugar Free ones!)  1/2 cup sugar-free jell-o  1 sugar-free popsicle  ¼ cup blueberries or strawberries  8oz Blue Mariela " "unsweetened almond milk (or there is sugar free vanilla flavored as well).  5 baby carrots & celery sticks, cucumbers, bell peppers dipped in ¼ cup salsa, 2Tbsp light ranch dressing or 2Tbsp plain Greek yogurt  10 Goldfish crackers  ½ oz low-fat cheese or string cheese  1 closed handful of nuts, unsalted (example-->almonds, pistachios, cashews, peanuts, etc).  1 Tbsp of sunflower seeds, unsalted  1 cup Smart Pop popcorn  1 whole grain brown rice cake   "Think Thin" protein bars - my personal favorite is Creamy Peanut butter, chocolate brownie, or oreo flavors.  "Uribe" bars  - can be found at [employer] grocery store - they have 5 grams of net carbohydrates.  Quest bars (my favorite is birthday cake, and also cinnamon roll is good melted for 10 seconds in the microwave - please remove the wrapper first!)  Premier protein shakes - sold at [employer], or other brand alternatives - usually 1 - 2 grams of carbs (strawberry, vanilla, chocolate flavors) Coffee flavor is my new morning favorite!   Scrambled eggs! Or a fried egg or boiled eggs - add sliced tomatoes, cilantro or some chopped green onions.   Eggs are good with any and all veggies! You can even eat them for dinner or in a low carb tortilla, or served with your favorite grilled meat/sausage or burr is my favorite.   Check out pre-made egg scrambles in the egg grocery store section - Ore Adelaida "just crack an egg" is premixed cheese, burr and small amount of pototes, small cup size portions for your breakfast - very convenient!   Veggie spirals - zucchini - can buy in the frozen foods section. Birds eye brand is usually cheap. Tip - saute with oil/onions or italian seasoning and use this as a pasta substitution.  Milk - is usually high in carbs/sugar - use [employer] milk brand instead - it is "filtered" milk - with half the amount of carbs of regular milk. (next to the milk in milk aisle).   Smuckers sugar free Breakfast syrup (or 1 tablespoon of low sugar breakfast " "syrup) instead of regular syrup.        15 gm carb  1 small piece of fruit or ½ banana or 1/2 cup lite canned fruit  3 ja cracker squares  3 cups Smart Pop popcorn, top spray butter, Mixon lite salt or cinnamon and Truvia  5 Vanilla Wafers  ½ cup low fat, no added sugar ice cream or frozen yogurt (Blue bell, Blue Bunny, Weight Watchers, Skinny Cow)  1/2 - 1 cup Light n' fit Vanilla yogurt (has added protein in it to make you feel full).   ½ turkey, ham, or chicken sandwich  ½ c fruit with ½ c Cottage cheese  4-6 unsalted wheat crackers with 1 oz low fat cheese or 1 tbsp peanut butter   30-45 goldfish crackers (depending on flavor)   7-8 Pentecostalism mini brown rice cakes (caramel, apple cinnamon, chocolate)   12 Pentecostalism mini brown rice cakes (cheddar, bbq, ranch)   1/3 cup hummus dip with raw veg  1/2 whole wheat abigail, 1Tbsp hummus  Mini Pizza (1/2 whole wheat English muffin, low-fat  cheese, tomato sauce)  100 calorie snack pack (Oreo, Chips Ahoy, Ritz Mix, Baked Cheetos)  4-6 oz. light or Greek Style yogurt (Chobani, Yoplait, Okios, [place])  ½ cup sugar-free pudding    6 in. wheat tortilla or abigail oven toasted chips (topped with spray butter flavoring, cinnamon, Truvia OR spray butter, garlic powder, chili powder)   18 BBQ Popchips (available at [employer], [employer] Foods, Fresh Market)  Mini bagel (small size) toasted - add fat free cream cheese or avocado and sliced tomato on top - yum!   1/2 cup Halo top icecream - birthday cake is my go-to flavor.   Truth Bars - can be found at Fresh market - Net carbs is 11 - 12 grams depending on the flavor.   Kind bars = mostly nuts and dried berries - find at most local groceries stores, drug stores, whole foods, fresh market. Net carbs is around 10 grams.     Smoothie Jaswinder - I'm often asked "what smoothie is healthy for me". Get the 20 oz. Size.   Do not be decieved to think that all smoothies are "healthy". In fact, most are loaded with hidden sugars.   Here are some from " "the menu that you are allowed to have being diabetic:   The gladiator - any flavor. This is the lowest in carbs (3 - 5 grams only)  Keto champ (berry, chocolate or coffee - all have 14 - 19 grams of carbs in 20 oz size).   The Lean 1 smoothies - vanilla, strawberry and chocolate have under 30 grams of carbs, so this is slightly more. But would be ok for a meal substitute or snack.   The Shredder in Vanilla or chocolate only.  17 grams of carbs - (the strawberry has more sugar considerably)    Tips and Tricks:     Eat an extra vegetable once per day - green veggies (such as broccoli, cauliflower, green beans) - make you feel full and are low in sugar.     My favorite "secret" seasoning to make things extra yummy is cinnamon for sweet taste   For an added secret "salty" taste - add "everything but the bagel" seasoning (you can get on amazon.com or at  joes. I have seen at local groceries such as [employer] too!).     Dark colored fruits are your friend -- blueberries, strawberries, raspberries, blackberries. They have a lower sugar content. So will be the best choice for you.   Light colored fruits are NOT your friend - they tend to be higher in sugar and are not the best options for an ADA diet - examples are oranges, bananas, peaches, watermelon, -- you can eat these, but carefully and in moderation.     WATER. I cannot emphasize drinking water enough - it will hydrate you, make you full. Your body needs it - it's a natural appetite suppressant.   Sometimes hunger and thirst can feel the same. Try drinking some water first, then eat if you are still hungry.     Other snack choices   Celery with peanut butter  Celery with tuna salad  Dill pickles and cheddar cheese (no kidding, it's a great combo)  Nuts (keep raw ones in the freezer if you think you'll overeat them)  Sunflower seeds (get them in the shell so it will take longer to eat them)  Other seeds (How to Toast Pumpkin or Squash Seeds)   Pistachios or almonds " "- will fill you up and are tasty!   Low-Carb Trail Mix  Jerky (beef or turkey -- try to find low-sugar varieties)  Salami slices (you can find in the deli section)  Cheese sticks, such as string cheese  Sugar-free Jello, alone or with cottage cheese and a sprinkling of nuts. Make sugar-free lime Jello with part coconut milk -- For a large package, dissolve the powder in a cup of boiling water, add a can of coconut milk, and then add the rest of the water. Stir well.  Pepperoni "chips" -- Zap the slices in the microwave  Cheese with a few apple slices  4-ounce plain or sugar-free yogurt with berries and flax seed meal  Smoked salmon and cream cheese on cucumber slices  Lettuce Roll-ups -- Roll luncheon meat, egg salad, tuna or other filling and veggies in lettuce leaves  Lunch Meat Roll-ups -- Roll cheese or veggies in lunch meat (read the labels for carbs on the lunch meat)  Spread bean dip, spinach dip, or other low-carb dip or spread on the lunch meat or lettuce and then roll it up  Raw veggies and spinach dip, or other low-carb dip  Pork rinds (Chicharrón), with or without dip  Ricotta cheese with fruit and/or nuts and/or flax seed meal  Mushrooms with cheese spread inside (or other spreads or dips)  Low-carb snack bars (watch out for sugar alcohols, especially maltitol)  Product Review: Atkins Advantage Bars  Pepperoni Chips -- Microwave pepperoni slices until crisp. Great with cheeses and dips  Garlic Parmesan Flax Seed Crackers  Parmesan Crisps -- Good when you want a crunchy snack.  Peanut Butter Protein Balls     "

## 2022-05-19 NOTE — PROGRESS NOTES
Diabetes Care Specialist Progress Note  Author: Emelina Burton RD, CDE  Date: 5/19/2022    Program Intake  Reason for Diabetes Program Visit:: Initial Diabetes Assessment  Current diabetes risk level:: moderate  In the last 12 months, have you:: none    Lab Results   Component Value Date    HGBA1C 7.5 (H) 05/16/2022       Clinical    Problem Review  Reviewed Problem List with Patient: yes  Active comorbidities affecting diabetes self-care.: yes  Comorbidities: Hypertension  Reviewed health maintenance: yes    Clinical Assessment  Current Diabetes Treatment: Injectable  Have you ever experienced hypoglycemia (low blood sugar)?: no  Have you ever experienced hyperglycemia (high blood sugar)?: yes  Are you able to tell when your blood sugar is high?: Yes  What are your symptoms?: fatigue  Have you ever been hospitalized because your blood sugar was high?: no    Medication Information  How do you obtain your medications?: Patient drives  How many days a week do you miss your medications?: Never  Do you sometimes have difficulty refilling your medications?: No  Medication adherence impacting ability to self-manage diabetes?: No    Labs  Do you have regular lab work to monitor your medications?: Yes    Nutritional Status  Diet: Regular (3 meals, drinks coffee and coke)  Change in appetite?: No  Dentation:: Intact  Recent Changes in Weight: No Recent Weight Change  Current nutritional status an area of need that is impacting patient's ability to self-manage diabetes?: No    Additional Social History    Support  Does anyone support you with your diabetes care?: no  Who supports you?: self  Who takes you to your medical appointments?: self  Does the current support meet the patient's needs?: Yes  Is Support an area impacting ability to self-manage diabetes?: No    Access to Mass Media & Technology  Does the patient have access to any of the following devices or technologies?: Smart phone  Media or technology needs impacting  ability to self-manage diabetes?: No    Cognitive/Behavioral Health  Alert and Oriented: Yes  Difficulty Thinking: No  Requires Prompting: No  Requires assistance for routine expression?: No  Cognitive or behavioral barriers impacting ability to self-manage diabetes?: No    Culture/Protestant  Culture or Advent beliefs that may impact ability to access healthcare: No    Communication  Language preference: English  Hearing Problems: No  Vision Problems: No  Communication needs impacting ability to self-manage diabetes?: No    Health Literacy  Preferred Learning Method: Face to Face, Video, Demonstration, Hands On  How often do you need to have someone help you read instructions, pamphlets, or written material from your doctor or pharmacy?: Never  Health literacy needs impacting ability to self-manage diabetes?: No      Diabetes Self-Management Skills Assessment    Diabetes Disease Process/Treatment Options  Patient/caregiver able to state what happens when someone has diabetes.: no  Patient/caregiver knows what type of diabetes they have.: yes  Diabetes Type : Type II  Patient/caregiver able to identify at least three signs and symptoms of diabetes.: no  Patient able to identify at least three risk factors for diabetes.: no  Diabetes Disease Process/Treatment Options: Skills Assessment Completed: Yes  Assessment indicates:: Instruction Needed  Area of need?: Yes     Reviewed diabetes disease process and treatment options    Nutrition/Healthy Eating  Challenges to healthy eating:: portion control, eating out, going to parties  Method of carbohydrate measurement:: no method  Patient can identify foods that impact blood sugar.: yes  Patient-identified foods:: sweets, soda, starches (bread, pasta, rice, cereal)  Nutrition/Healthy Eating Skills Assessment Completed:: Yes  Assessment indicates:: Instruction Needed  Area of need?: Yes     Reviewed CHO counting, label reading and addt'l resources to assist w/ CHO counting;  plate method reviewed; alternatives to sugary beverages discussed    Physical Activity/Exercise  Patient's daily activity level:: sedentary  Patient formally exercises outside of work.: no  Reasons for not exercising:: time constraints  Patient can identify forms of physical activity.: yes  Stated forms of physical activity:: any movement performed by muscles that uses energy, housework, manual activity at work, recreational activities  Patient can identify reasons why exercise/physical activity is important in diabetes management.: no  Physical Activity/Exercise Skills Assessment Completed: : Yes  Assessment indicates:: Instruction Needed  Area of need?: Yes     Reviewed goals and benefits    Medications  Patient is able to describe current diabetes management routine.: yes  Diabetes management routine:: injectable medications  Patient is able to identify current diabetes medications, dosages, and appropriate timing of medications.: yes  Patient understands the purpose of the medications taken for diabetes.: no  Patient reports problems or concerns with current medication regimen.: no  Medication Skills Assessment Completed:: Yes  Assessment indicates:: Instruction Needed  Area of need?: Yes     Reviewed MOA of medication and regimen; reviewed instructions for use of the Ozempic pen as well as injection sites    Home Blood Glucose Monitoring  Patient states that blood sugar is checked at home daily.: yes  Monitoring Method:: home glucometer, personal continuous glucose monitor  How often do you check your blood sugar?: 4 times a day  Blood glucose logs reviewed today?: no  Personal CGM type:: Manoj sample start today  Patient is able to use personal CGM appropriately.: no  Home Blood Glucose Monitoring Skills Assessment Completed: : Yes  Assessment indicates:: Instruction Needed  Area of need?: Yes     Instructed patient on use of the Freestyle Manoj 2. Patient inserted sensor in the back of her left arm. Scanned  sensor for new sensor start. Advised there is a 1 hour warm-up period before reader/sensor can be used. Sensor should be changed out every 14 days and scanned at least once every 8 hours to avoid gaps in graphs, but recommended to scan more often - before meals, 2 hours after meals and HS. Advised that Vitamin C, Aspirin and dehydration may result in less accurate readings and sensor needs to be removed for MRI, CT or Diathermy.    Acute Complications  Patient is able to identify types of acute complications: No  Acute Complications Skills Assessment Completed: : Yes  Assessment indicates:: Instruction Needed  Area of need?: Yes     Reviewed s/s and treatment of hypo/hyperglycemia    Chronic Complications  Patient can identify major chronic complications of diabetes.: no  Patient is taking statin?: Yes  Chronic Complications Skills Assessment Completed: : Yes  Assessment indicates:: Instruction Needed  Area of need?: Yes     Reviewed standards of care    Psychosocial/Coping  Patient can identify ways of coping with chronic disease.: no (see comments)  Psychosocial/Coping Skills Assessment Completed: : Yes  Assessment indicates:: Instruction Needed  Area of need?: Yes     Reviewed ways of coping with stress      Assessment Summary and Plan    Based on today's diabetes care assessment, the following areas of need were identified:      Social 5/19/2022   Support No   Access to Mass Media/Tech No   Cognitive/Behavioral Health No   Culture/Muslim No   Communication No   Health Literacy No        Clinical 5/19/2022   Medication Adherence No   Nutritional Status No        Diabetes Self-Management Skills 5/19/2022   Diabetes Disease Process/Treatment Options Yes   Nutrition/Healthy Eating Yes   Physical Activity/Exercise Yes   Medication Yes   Home Blood Glucose Monitoring Yes   Acute Complications Yes   Chronic Complications Yes   Psychosocial/Coping Yes          Today's interventions were provided through individual  discussion, instruction, and written materials were provided.      Patient verbalized understanding of instruction and written materials.  Pt was able to return back demonstration of instructions today. Patient understood key points, needs reinforcement and further instruction.     Diabetes Self-Management Care Plan:    Today's Diabetes Self-Management Care Plan was developed with Nahed's input. Nahed has agreed to work toward the following goal(s) to improve his/her overall diabetes control.      Care Plan: Diabetes Management   Updates made since 4/19/2022 12:00 AM      Problem: Blood Glucose Self-Monitoring       Goal: Use Manoj 2 as instructed    Start Date: 5/19/2022   Expected End Date: 8/19/2022   Priority: High   Barriers: No Barriers Identified            Task: Instructed patient on use of the Manoj 2 Completed 5/19/2022          Follow Up Plan     Follow up in about 2 weeks (around 6/2/2022).    Today's care plan and follow up schedule was discussed with patient.  Nahed verbalized understanding of the care plan, goals, and agrees to follow up plan.        The patient was encouraged to communicate with his/her health care provider/physician and care team regarding his/her condition(s) and treatment.  I provided the patient with my contact information today and encouraged to contact me via phone or [facility] Patient Portal as needed.     Length of Visit   Total Time: 90 Minutes

## 2022-05-25 ENCOUNTER — PATIENT MESSAGE (OUTPATIENT)
Dept: INTERNAL MEDICINE | Facility: CLINIC | Age: 47
End: 2022-05-25
Payer: COMMERCIAL

## 2022-05-25 PROBLEM — E66.01 SEVERE OBESITY (BMI 35.0-39.9) WITH COMORBIDITY: Status: ACTIVE | Noted: 2022-05-25

## 2022-05-25 NOTE — TELEPHONE ENCOUNTER
The patient states her numbers are 160 -180 upon waking up and after breakfast it 250 or close to that number. The numbers go down in a few hours. Should she be concern.

## 2022-05-25 NOTE — TELEPHONE ENCOUNTER
I called and s/w patient.   She has only taken 1 injection of ozempic - it's been about 5 days since her first injection.   She doesn't always go up to 200 - but will go up on occasion with eating. The sugars always comes back down.   She notices what foods are making her sugars go up now on the free style kalani.   She has cut out cokes.   She is walking now for exercise.   She is noticing for much of the day, the sugars come down to 112 - 120's.   I told her for now, to just keep doing what she's doing - give this a few weeks to see if her sugars regulate after getting on the ozempic regularly and with the consistent dietary and activity changes.   She can (if she wants) - take a 1/2 tablet of glimepiride with breakfast or lunch if her sugar is high going into the meal.   However I advised her to use caution with this. As now that she's on ozempic, this in combination with it can make her blood sugars too low.   She verbalized understanding.   She has follow up with me next month.   Thanks,  rosendo

## 2022-06-01 ENCOUNTER — PATIENT MESSAGE (OUTPATIENT)
Dept: ADMINISTRATIVE | Facility: HOSPITAL | Age: 47
End: 2022-06-01
Payer: COMMERCIAL

## 2022-06-02 ENCOUNTER — CLINICAL SUPPORT (OUTPATIENT)
Dept: DIABETES | Facility: CLINIC | Age: 47
End: 2022-06-02
Payer: COMMERCIAL

## 2022-06-02 DIAGNOSIS — E11.9 TYPE 2 DIABETES MELLITUS WITHOUT COMPLICATION, WITHOUT LONG-TERM CURRENT USE OF INSULIN: ICD-10-CM

## 2022-06-02 PROCEDURE — 95249 PR GLUCOSE MONITORING, 72 HRS, SUB-Q SENSOR, PATIENT PROVIDED: ICD-10-PCS | Mod: ,,, | Performed by: DIETITIAN, REGISTERED

## 2022-06-02 PROCEDURE — 95249 CONT GLUC MNTR PT PROV EQP: CPT | Mod: ,,, | Performed by: DIETITIAN, REGISTERED

## 2022-06-02 NOTE — PROGRESS NOTES
Diabetes Care Specialist Progress Note  Author: Emelina Burton RD, CDE  Date: 6/2/2022     Diabetes Care Specialist Virtual Visit Note   It was in the patient's best interest to receive diabetes self-management education and support services in this format to prevent the exposure to COVID-19.        The patient location is: home in [place]  The chief complaint leading to consultation is: Diabetes  Visit type: audiovisual  Total time spent with patient: 30 min   Each patient to whom he or she provides medical services by telemedicine is:  (1) informed of the relationship between the physician and patient and the respective role of any other health care provider with respect to management of the patient; and (2) notified that he or she may decline to receive medical services by telemedicine and may withdraw from such care at any time.    Program Intake  Reason for Diabetes Program Visit:: Intervention  Type of Intervention:: Individual  Individual: Device Training  Device Training: Personal CGM    Lab Results   Component Value Date    HGBA1C 7.5 (H) 05/16/2022     Diabetes Self-Management Skills Assessment    Patient was started on sample Manoj 2 weeks ago. Needs assistance today with starting personal Manoj 2 sensor. Instructed patient on use of the Freestyle Manoj 2. Patient inserted sensor in the back of her right arm. Scanned sensor for new sensor start. Advised there is a 1 hour warm-up period before reader/sensor can be used. Sensor should be changed out every 14 days and scanned at least once every 8 hours to avoid gaps in graphs, but recommended to scan more often - before meals, 2 hours after meals and HS. Advised that Vitamin C, Aspirin and dehydration may result in less accurate readings and sensor needs to be removed for MRI, CT or Diathermy.    Assessment Summary and Plan    Based on today's diabetes care assessment, the following areas of need were identified:      Social 5/19/2022   Support No   Access  to Mass Media/Tech No   Cognitive/Behavioral Health No   Culture/Judaism No   Communication No   Health Literacy No        Clinical 5/19/2022   Medication Adherence No   Nutritional Status No        Diabetes Self-Management Skills 5/19/2022   Diabetes Disease Process/Treatment Options Yes   Nutrition/Healthy Eating Yes   Physical Activity/Exercise Yes   Medication Yes   Home Blood Glucose Monitoring Yes   Acute Complications Yes   Chronic Complications Yes   Psychosocial/Coping Yes          Today's interventions were provided through individual discussion, instruction, and written materials were provided.      Patient verbalized understanding of instruction and written materials.  Pt was able to return back demonstration of instructions today. Patient understood key points, needs reinforcement and further instruction.     Diabetes Self-Management Care Plan:    Today's Diabetes Self-Management Care Plan was developed with Nahed's input. Nahed has agreed to work toward the following goal(s) to improve his/her overall diabetes control.      Care Plan: Diabetes Management   Updates made since 5/3/2022 12:00 AM      Problem: Blood Glucose Self-Monitoring       Goal: Use Manoj 2 as instructed    Start Date: 5/19/2022   Expected End Date: 8/19/2022   Priority: High   Barriers: No Barriers Identified      Task: Provided patient with a meter today and sent Rx request to provider to send to patients pharmacy.       Task: Reviewed the importance of self-monitoring blood glucose and keeping logs.       Task: Instructed on how to self-monitor blood glucose using a home glucometer, how to properly dispose of used strips and lancets after use, and how to appropriately store meter and supplies.       Task: Provided patient with blood glucose logs, reviewed appropriate timing and frequency to SMBG, education on parameters on when to notify provider and advised patient to bring logs to all appts with  PCP/Endocrinologist/Diabetes Care Specialist.       Task: Instructed patient on use of the Manoj 2 Completed 5/19/2022          Follow Up Plan     Follow up in about 4 weeks (around 6/30/2022). Will see ENDO provider.    Today's care plan and follow up schedule was discussed with patient.  Nahed verbalized understanding of the care plan, goals, and agrees to follow up plan.        The patient was encouraged to communicate with his/her health care provider/physician and care team regarding his/her condition(s) and treatment.  I provided the patient with my contact information today and encouraged to contact me via phone or [facility] Patient Portal as needed.     Length of Visit   Total Time: 30 Minutes

## 2022-06-20 ENCOUNTER — PATIENT MESSAGE (OUTPATIENT)
Dept: INTERNAL MEDICINE | Facility: CLINIC | Age: 47
End: 2022-06-20
Payer: COMMERCIAL

## 2022-06-20 RX ORDER — ATORVASTATIN CALCIUM 40 MG/1
40 TABLET, FILM COATED ORAL DAILY
Qty: 90 TABLET | Refills: 3 | Status: SHIPPED | OUTPATIENT
Start: 2022-06-20 | End: 2023-05-04 | Stop reason: SDUPTHER

## 2022-06-20 NOTE — TELEPHONE ENCOUNTER
No new care gaps identified.  [facility] Embedded Care Gaps. Reference number: [id number]. 6/20/2022   12:12:25 PM CDT

## 2022-06-30 ENCOUNTER — OFFICE VISIT (OUTPATIENT)
Dept: INTERNAL MEDICINE | Facility: CLINIC | Age: 47
End: 2022-06-30
Payer: COMMERCIAL

## 2022-06-30 VITALS — WEIGHT: 195 LBS | BODY MASS INDEX: 35.67 KG/M2

## 2022-06-30 DIAGNOSIS — E66.01 SEVERE OBESITY (BMI 35.0-39.9) WITH COMORBIDITY: ICD-10-CM

## 2022-06-30 DIAGNOSIS — I10 HYPERTENSION, UNSPECIFIED TYPE: Chronic | ICD-10-CM

## 2022-06-30 DIAGNOSIS — E11.9 TYPE 2 DIABETES MELLITUS WITHOUT COMPLICATION, WITHOUT LONG-TERM CURRENT USE OF INSULIN: Primary | ICD-10-CM

## 2022-06-30 DIAGNOSIS — E03.9 HYPOTHYROIDISM, UNSPECIFIED TYPE: ICD-10-CM

## 2022-06-30 PROCEDURE — 99214 PR OFFICE/OUTPT VISIT, EST, LEVL IV, 30-39 MIN: ICD-10-PCS | Mod: 95,,, | Performed by: NURSE PRACTITIONER

## 2022-06-30 PROCEDURE — 1160F RVW MEDS BY RX/DR IN RCRD: CPT | Mod: CPTII,95,, | Performed by: NURSE PRACTITIONER

## 2022-06-30 PROCEDURE — 3008F PR BODY MASS INDEX (BMI) DOCUMENTED: ICD-10-PCS | Mod: CPTII,95,, | Performed by: NURSE PRACTITIONER

## 2022-06-30 PROCEDURE — 4010F PR ACE/ARB THEARPY RXD/TAKEN: ICD-10-PCS | Mod: CPTII,95,, | Performed by: NURSE PRACTITIONER

## 2022-06-30 PROCEDURE — 1159F PR MEDICATION LIST DOCUMENTED IN MEDICAL RECORD: ICD-10-PCS | Mod: CPTII,95,, | Performed by: NURSE PRACTITIONER

## 2022-06-30 PROCEDURE — 3061F NEG MICROALBUMINURIA REV: CPT | Mod: CPTII,95,, | Performed by: NURSE PRACTITIONER

## 2022-06-30 PROCEDURE — 3066F NEPHROPATHY DOC TX: CPT | Mod: CPTII,95,, | Performed by: NURSE PRACTITIONER

## 2022-06-30 PROCEDURE — 3051F PR MOST RECENT HEMOGLOBIN A1C LEVEL 7.0 - < 8.0%: ICD-10-PCS | Mod: CPTII,95,, | Performed by: NURSE PRACTITIONER

## 2022-06-30 PROCEDURE — 99214 OFFICE O/P EST MOD 30 MIN: CPT | Mod: 95,,, | Performed by: NURSE PRACTITIONER

## 2022-06-30 PROCEDURE — 3061F PR NEG MICROALBUMINURIA RESULT DOCUMENTED/REVIEW: ICD-10-PCS | Mod: CPTII,95,, | Performed by: NURSE PRACTITIONER

## 2022-06-30 PROCEDURE — 3008F BODY MASS INDEX DOCD: CPT | Mod: CPTII,95,, | Performed by: NURSE PRACTITIONER

## 2022-06-30 PROCEDURE — 4010F ACE/ARB THERAPY RXD/TAKEN: CPT | Mod: CPTII,95,, | Performed by: NURSE PRACTITIONER

## 2022-06-30 PROCEDURE — 1159F MED LIST DOCD IN RCRD: CPT | Mod: CPTII,95,, | Performed by: NURSE PRACTITIONER

## 2022-06-30 PROCEDURE — 3066F PR DOCUMENTATION OF TREATMENT FOR NEPHROPATHY: ICD-10-PCS | Mod: CPTII,95,, | Performed by: NURSE PRACTITIONER

## 2022-06-30 PROCEDURE — 1160F PR REVIEW ALL MEDS BY PRESCRIBER/CLIN PHARMACIST DOCUMENTED: ICD-10-PCS | Mod: CPTII,95,, | Performed by: NURSE PRACTITIONER

## 2022-06-30 PROCEDURE — 3051F HG A1C>EQUAL 7.0%<8.0%: CPT | Mod: CPTII,95,, | Performed by: NURSE PRACTITIONER

## 2022-06-30 RX ORDER — SEMAGLUTIDE 1.34 MG/ML
1 INJECTION, SOLUTION SUBCUTANEOUS WEEKLY
Qty: 1 PEN | Refills: 3 | Status: SHIPPED | OUTPATIENT
Start: 2022-06-30 | End: 2022-11-01 | Stop reason: SDUPTHER

## 2022-06-30 NOTE — PROGRESS NOTES
[facility]   The chief complaint leading to consultation is: T2dm   Visit type: audiovisual  Face to Face time with patient: 20 minutes  30 minutes of total time spent on the encounter, which includes face to face time and non-face to face time preparing to see the patient (eg, review of tests), Obtaining and/or reviewing separately obtained history, Documenting clinical information in the electronic or other health record, Independently interpreting results (not separately reported) and communicating results to the patient/family/caregiver, or Care coordination (not separately reported).     Each patient to whom he or she provides medical services by telemedicine is:  (1) informed of the relationship between the physician and patient and the respective role of any other health care provider with respect to management of the patient; and (2) notified that he or she may decline to receive medical services by telemedicine and may withdraw from such care at any time.    Notes for today's visit  46 y.o. female here for 6 week follow up for management of T2dm -   Last visit for initial consult was 5/19/22 - those notes below.     Labs were at 7.5%, no new labs for today's visit -   Was on januvia, and jardiance - stopped these and switched her to glp1 -   Newly on ozempic - she is now at 0.5mg every week.   Sometimes has curbed appetite, eating smaller portions and 2 meals and a snack.   Has lost 6 lbs since 4 weeks.   Is walking more for exercise  Feeling better/more energy.   Denies any side effects on glp1 -     Is newly on kalani 2 - personal cgm - she is using that to scan/check sugars.   This is a new personal -   Downloaded and reviewed today - see scanned in .     Average glucose is 167   a1c is @ 7.3%.   71% time in range.   0% lows.   28% highs.   1% very highs.       Last visit notes from 5/19/2022:   HPI: Nahed Mcginnis is a 46 y.o.  female c/I for visit  to address Diabetes Type 2  This is the first time I am seeing her for care, follows with Dr. Fausto Hammonds MD for primary care needs.   Has not seen endocrinology or diabetes specialist in the past.     was diagnosed with T2DM in 2 - 3 years ago    No family history known - father borderline T2dm -  but .   Has never been hospitalized r/t DM.  Denies missing doses of DM medication.    Current a1c is @ 7.5%   Reports being more controlled glucose levels in years past.   Was begun on metformin originally but stopped taking with GI side effects   Was switched to januvia, then had glimiperide added.   Sugars still weren't to goal -   So jardiance was added last year.   She stopped the jardiance after recurrent yeast infections.   However when her recent labs came back, she restarted (a couple weeks ago).   She does check her sugars and has home glucometer -   Would like to use CGM if available to her. Does not have RX yet.      Reports weight gain progressively over several years,   She is very motivated to lose weight -   Goal weight is 140 lbs.   Admits has not been following ADA diet - fast food often and hectic schedule - she is working full time and is a single mother of a 10 year old boy, so doesn't have much time for cooking/meal preparation.     Complications from diabetes and pertinent co-morbidities include:   Negative for DKA.   Has never taken insulin in life.   -negative for diabetic neuropathy.   -negative for nephropathy.   No microalbuminuria.   Eyes:  negative for Diabetic retinopathy   CV: Denies history of MI nor stroke.   CAD: Denies.  Does not take aspirin 81mg tablet daily  BP: has history of HTN  Statin: Taking  ACE/ARB: Taking    Social History     Tobacco Use   Smoking Status Never Smoker   Smokeless Tobacco Never Used      Past medical History:   Past Medical History:   Diagnosis Date    DM (diabetes mellitus)     Hypertension     Hypothyroidism       Family hx:   Family History    Problem Relation Age of Onset    Hypertension Mother     Heart disease Father       Current meds:   Current Outpatient Medications:     atorvastatin (LIPITOR) 40 MG tablet, Take 1 tablet (40 mg total) by mouth once daily., Disp: 90 tablet, Rfl: 3    flash glucose sensor (FREESTYLE JENNY 2 SENSOR) Kit, 1 each by Misc.(Non-Drug; Combo Route) route every 14 (fourteen) days., Disp: 2 kit, Rfl: 11    levothyroxine (SYNTHROID) 50 MCG tablet, Take 1 tablet (50 mcg total) by mouth once daily., Disp: 90 tablet, Rfl: 1    lisinopril 10 MG tablet, Take 20 mg by mouth once daily., Disp: , Rfl:     metoprolol succinate (TOPROL-XL) 25 MG 24 hr tablet, Take 1 tablet (25 mg total) by mouth once daily., Disp: 90 tablet, Rfl: 4    omeprazole (PRILOSEC) 20 MG capsule, Take 20 mg by mouth once daily., Disp: , Rfl:     ondansetron (ZOFRAN-ODT) 4 MG TbDL, Take 1 tablet (4 mg total) by mouth 2 (two) times daily as needed., Disp: 60 tablet, Rfl: 1    semaglutide (OZEMPIC) 1 mg/dose (4 mg/3 mL), Inject 1 mg into the skin once a week., Disp: 1 pen, Rfl: 3    spironolactone (ALDACTONE) 25 MG tablet, Take 12.5 mg by mouth once daily., Disp: , Rfl:     terconazole (TERAZOL 3) 0.8 % vaginal cream, Place 1 applicator vaginally every evening., Disp: 20 g, Rfl: 0    TRUE METRIX GLUCOSE TEST STRIP Strp, Pt test her blood sugars 4 times a day, Disp: 200 each, Rfl: 11     Current Diabetes medications:    ozempic 0.5mg every week -     Medications Tried and Failed:   Metformin - gi side effects.   jardiance - yeast infections. Stopped in 2021 -  DPP4 - Januvia  SGLT2 - jardiance - just restarted recently - however has new yeast infection.   Glimepiride 4mg with breakfast.    Social:   Lives at home with: mom and son - 10 years old -   Life changes/stressors currently - single mom, so very hard to work/balance work life.   Diet: following ADA diet   Meals: 3 per day and snacks.        Breakfast - burr, english muffin       Lunch -  leftovers from dinner - pizza or mcdonalds -        Dinner - cooking at home, eating out.        Snacks - none.         Drinks - coffee with sugar free creamer- coke   Exercise: none.   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      Standards of care:   Eyes: .Most Recent Eye Exam Date: Not Found  Foot exam: : 05/19/2022   Diabetes education: None.    Vital Signs  Wt 88.5 kg (195 lb)   BMI 35.67 kg/m²     Pertinent Labs:   Hgba1c   Lab Results   Component Value Date    HGBA1C 7.5 (H) 05/16/2022     Lipid panel   Lab Results   Component Value Date    CHOL 154 05/16/2022     Lab Results   Component Value Date    HDL 43 05/16/2022     Lab Results   Component Value Date    LDLCALC 74.8 05/16/2022     Lab Results   Component Value Date    TRIG 181 (H) 05/16/2022     Lab Results   Component Value Date    CHOLHDL 27.9 05/16/2022      CMP  Glucose   Date Value Ref Range Status   05/16/2022 145 (H) 70 - 110 mg/dL Final     BUN   Date Value Ref Range Status   05/16/2022 10 6 - 20 mg/dL Final     Creatinine   Date Value Ref Range Status   05/16/2022 0.9 0.5 - 1.4 mg/dL Final     eGFR if    Date Value Ref Range Status   05/16/2022 >60.0 >60 mL/min/1.73 m^2 Final     eGFR if non    Date Value Ref Range Status   05/16/2022 >60.0 >60 mL/min/1.73 m^2 Final     Comment:     Calculation used to obtain the estimated glomerular filtration  rate (eGFR) is the CKD-EPI equation.        AST   Date Value Ref Range Status   05/16/2022 23 10 - 40 U/L Final     ALT   Date Value Ref Range Status   05/16/2022 22 10 - 44 U/L Final     Microalbumin creatinine ratio:   Lab Results   Component Value Date    MICALBCREAT 4.3 05/16/2022       Review Of Systems:   Gen: Appetite good, + weight loss 6 lbs since last visit, improved energy levels, Denies polydipsia.  Skin:  Skin is intact and heals well, denies any rashes or hair changes.   EENT: Denies any acute visual disturbances, nor blurred vision.   Resp: Denies SOB or Dyspnea on exertion, denies cough.   Cardiac: Denies chest pain, palpitations, or swelling.   GI: Denies abdominal pain, nausea or vomiting, diarrhea, or constipation.   /GYN: Denies nocturia, nor burning, frequency or pain on urination.   MS/Neuro: Denies numbness/ tingling in BLE; Gait steady, speech clear  Psych: Denies drug/ETOH abuse, no hx of depression.  Other systems: negative.    Physical Exam: limited as this is a virtual visit:   GENERAL: Well developed, well nourished in appearance.   PSYCH: AAOx3, appropriate mood and affect, pleasant expression, conversant, appears relaxed, well groomed.   EYES: Conjunctiva and corneas clear  NECK:  trachea midline  CHEST: Even, regular, and unlabored respirations    Assessment and Plan of Care:     Diagnoses and all orders for this visit:    Type 2 diabetes mellitus without complication, without long-term current use of insulin  -     semaglutide (OZEMPIC) 1 mg/dose (4 mg/3 mL); Inject 1 mg into the skin once a week.  -     Hemoglobin A1C; Future  -     Comprehensive Metabolic Panel; Future  -     TSH; Future  -     T4, Free; Future    Hypertension, unspecified type    Hypothyroidism, unspecified type    Severe obesity (BMI 35.0-39.9) with comorbidity       1. T2DM with hyperglycemia- Hgba1c goal is 7.5% or less without hypoglycemia - @ 7.5% currently.   discussed DM, progression of disease, long term complications, CV risk factors and tx options.   Advise compliance with ADA diet and encourage exercise  Continue ozempic - increase from 0.5 to 1mg every week.   No known history of pancreatitis or medullary thyroid cancer.   If you experience severe abdominal pain, nausea, or diarrhea - please call me or notify my office immediately.   Continue kalani 2     2. HTN- controlled, continue meds as previously prescribed  and monitor.   toprol XL   Lisinopril 10mg tablet dially.     3. Lipids- LDL goal < 100. At goal.   Currently on statin therapy- lipitor 40 mg daily.     4. Weight - BMI Body mass index is 35.67 kg/m².   Encourage Ada diet and exercise.   Increase glp1.     5. Renal Function - stable, no nephropathy. Continue to monitor.   No urine mac.     6. Hypothyroid - defer to primary. She is on synthroid 50mcg tablet daily.   May need adjustments if she loses weight.   Recheck tft's next lab draw.          Labs in 2 months.   OV in 3 months -

## 2022-06-30 NOTE — PATIENT INSTRUCTIONS
Continue ozempic - increase the dose from 0.5 to 1mg every week.   Continue small frequent meals.   Don't skip meals.   Even if it's something small to eat/drink at lunch time, it's best to feed your body a small amount of calories and carbohydrates rather than skip.     Continue to use the free style kalani - scan as often as you can -   Our aim right now is fasting glucose levels 90 - 120's -     Continue to exercise as tolerated.   Stay hdyrated.     Let me know if you have any issues/side effects from the ozempic such as severe abdominal pain, or nausea or vomiting.

## 2022-07-05 ENCOUNTER — PATIENT OUTREACH (OUTPATIENT)
Dept: ADMINISTRATIVE | Facility: HOSPITAL | Age: 47
End: 2022-07-05
Payer: COMMERCIAL

## 2022-07-05 ENCOUNTER — PATIENT MESSAGE (OUTPATIENT)
Dept: ADMINISTRATIVE | Facility: HOSPITAL | Age: 47
End: 2022-07-05
Payer: COMMERCIAL

## 2022-07-23 ENCOUNTER — PATIENT MESSAGE (OUTPATIENT)
Dept: INTERNAL MEDICINE | Facility: CLINIC | Age: 47
End: 2022-07-23
Payer: COMMERCIAL

## 2022-07-25 RX ORDER — OMEPRAZOLE 20 MG/1
20 CAPSULE, DELAYED RELEASE ORAL DAILY
Qty: 90 CAPSULE | Refills: 3 | Status: SHIPPED | OUTPATIENT
Start: 2022-07-25 | End: 2023-05-04 | Stop reason: SDUPTHER

## 2022-07-25 NOTE — TELEPHONE ENCOUNTER
No new care gaps identified.  Health [facility] Embedded Care Gaps. Reference number: [id number]. 7/25/2022   8:54:01 AM HAWAT

## 2022-08-11 ENCOUNTER — PATIENT MESSAGE (OUTPATIENT)
Dept: INTERNAL MEDICINE | Facility: CLINIC | Age: 47
End: 2022-08-11
Payer: COMMERCIAL

## 2022-08-11 NOTE — TELEPHONE ENCOUNTER
I would prefer Ms. Mcginnis be evaluated prior to selecting a cough medication.  If she is experiencing these exacerbations frequently, might benefit from establishment with pulmonology (none prior).  Should still have influenza ruled out.  Steroids would worsen underlying diabetes.  Community-acquired pneumonia should be considered... etc.  please ask that she make an appointment for evaluation prior to consideration of treatment    Thank you for your time.

## 2022-08-11 NOTE — TELEPHONE ENCOUNTER
The patient is requesting cough medication. Covid test negative. No other symptoms. Please advise.    Good afternoon,  I have been having a cough at nighttime mostly for a few days now.  Kind of like a bronchitis cough, which I do get about once a year.  I have tested negative for covid as well.  But my nights, I cough a lot and dont get any sleep.  asking if you could send in a prescription for a cough med for me and or steroid.  I have no other symptoms and no fever at all.  Feel good other than this cough

## 2022-08-16 ENCOUNTER — PATIENT MESSAGE (OUTPATIENT)
Dept: INTERNAL MEDICINE | Facility: CLINIC | Age: 47
End: 2022-08-16
Payer: COMMERCIAL

## 2022-08-16 RX ORDER — LEVOTHYROXINE SODIUM 50 UG/1
50 TABLET ORAL DAILY
Qty: 90 TABLET | Refills: 1 | Status: SHIPPED | OUTPATIENT
Start: 2022-08-16 | End: 2023-04-03 | Stop reason: SDUPTHER

## 2022-08-16 RX ORDER — LISINOPRIL 10 MG/1
20 TABLET ORAL DAILY
Qty: 180 TABLET | Refills: 3 | Status: SHIPPED | OUTPATIENT
Start: 2022-08-16 | End: 2023-05-04 | Stop reason: SDUPTHER

## 2022-08-16 RX ORDER — SPIRONOLACTONE 25 MG/1
12.5 TABLET ORAL DAILY
Qty: 45 TABLET | Refills: 3 | Status: SHIPPED | OUTPATIENT
Start: 2022-08-16 | End: 2023-05-04

## 2022-08-16 RX ORDER — METOPROLOL SUCCINATE 25 MG/1
25 TABLET, EXTENDED RELEASE ORAL DAILY
Qty: 90 TABLET | Refills: 4 | Status: CANCELLED | OUTPATIENT
Start: 2022-08-16

## 2022-08-16 NOTE — TELEPHONE ENCOUNTER
No new care gaps identified.  [facility] Embedded Care Gaps. Reference number: [id number]. 8/16/2022   6:42:33 AM HAWAT

## 2022-08-18 ENCOUNTER — PATIENT MESSAGE (OUTPATIENT)
Dept: INTERNAL MEDICINE | Facility: CLINIC | Age: 47
End: 2022-08-18
Payer: COMMERCIAL

## 2022-08-18 NOTE — TELEPHONE ENCOUNTER
Pt wanted to let you know that the she has loss weight and blood glucose is looking good, she is closer to her goal. Labs scheduled

## 2022-08-22 ENCOUNTER — LAB VISIT (OUTPATIENT)
Dept: LAB | Facility: HOSPITAL | Age: 47
End: 2022-08-22
Payer: COMMERCIAL

## 2022-08-22 DIAGNOSIS — E11.9 TYPE 2 DIABETES MELLITUS WITHOUT COMPLICATION, WITHOUT LONG-TERM CURRENT USE OF INSULIN: ICD-10-CM

## 2022-08-22 LAB
ALBUMIN SERPL BCP-MCNC: 3.6 G/DL (ref 3.5–5.2)
ALP SERPL-CCNC: 97 U/L (ref 55–135)
ALT SERPL W/O P-5'-P-CCNC: 21 U/L (ref 10–44)
ANION GAP SERPL CALC-SCNC: 10 MMOL/L (ref 8–16)
AST SERPL-CCNC: 20 U/L (ref 10–40)
BILIRUB SERPL-MCNC: 0.6 MG/DL (ref 0.1–1)
BUN SERPL-MCNC: 11 MG/DL (ref 6–20)
CALCIUM SERPL-MCNC: 9.5 MG/DL (ref 8.7–10.5)
CHLORIDE SERPL-SCNC: 103 MMOL/L (ref 95–110)
CO2 SERPL-SCNC: 22 MMOL/L (ref 23–29)
CREAT SERPL-MCNC: 0.7 MG/DL (ref 0.5–1.4)
EST. GFR  (NO RACE VARIABLE): >60 ML/MIN/1.73 M^2
ESTIMATED AVG GLUCOSE: 146 MG/DL (ref 68–131)
GLUCOSE SERPL-MCNC: 98 MG/DL (ref 70–110)
HBA1C MFR BLD: 6.7 % (ref 4–5.6)
POTASSIUM SERPL-SCNC: 4 MMOL/L (ref 3.5–5.1)
PROT SERPL-MCNC: 7 G/DL (ref 6–8.4)
SODIUM SERPL-SCNC: 135 MMOL/L (ref 136–145)
T4 FREE SERPL-MCNC: 0.88 NG/DL (ref 0.71–1.51)
TSH SERPL DL<=0.005 MIU/L-ACNC: 1.83 UIU/ML (ref 0.4–4)

## 2022-08-22 PROCEDURE — 36415 COLL VENOUS BLD VENIPUNCTURE: CPT | Mod: PO | Performed by: NURSE PRACTITIONER

## 2022-08-22 PROCEDURE — 84439 ASSAY OF FREE THYROXINE: CPT | Performed by: NURSE PRACTITIONER

## 2022-08-22 PROCEDURE — 84443 ASSAY THYROID STIM HORMONE: CPT | Performed by: NURSE PRACTITIONER

## 2022-08-22 PROCEDURE — 83036 HEMOGLOBIN GLYCOSYLATED A1C: CPT | Performed by: NURSE PRACTITIONER

## 2022-08-22 PROCEDURE — 80053 COMPREHEN METABOLIC PANEL: CPT | Performed by: NURSE PRACTITIONER

## 2022-08-24 ENCOUNTER — PATIENT MESSAGE (OUTPATIENT)
Dept: ADMINISTRATIVE | Facility: HOSPITAL | Age: 47
End: 2022-08-24
Payer: COMMERCIAL

## 2022-09-26 ENCOUNTER — PATIENT MESSAGE (OUTPATIENT)
Dept: INTERNAL MEDICINE | Facility: CLINIC | Age: 47
End: 2022-09-26
Payer: COMMERCIAL

## 2022-10-05 ENCOUNTER — TELEPHONE (OUTPATIENT)
Dept: PHARMACY | Facility: CLINIC | Age: 47
End: 2022-10-05
Payer: COMMERCIAL

## 2022-10-28 ENCOUNTER — TELEPHONE (OUTPATIENT)
Dept: INTERNAL MEDICINE | Facility: CLINIC | Age: 47
End: 2022-10-28
Payer: COMMERCIAL

## 2022-10-28 ENCOUNTER — PATIENT MESSAGE (OUTPATIENT)
Dept: INTERNAL MEDICINE | Facility: CLINIC | Age: 47
End: 2022-10-28
Payer: COMMERCIAL

## 2022-10-28 NOTE — TELEPHONE ENCOUNTER
I spoke to pt, she's upset that her appt have been rescheduled several times.  She's a new diabetic and have questions regarding her testing equipment.  She had some low readings and was concerned.  We discussed her eating/dietary habits and she was advised is her BS drops very low she should be evaluated by UC/PCP.  She was encouraged to have a snack before bed if her readings are low at bedtime.  She will schedule her f/u through the portal.  Pt was encouraged to call or send a message if she had any other concerns.  Pt verbalized understanding

## 2022-10-28 NOTE — TELEPHONE ENCOUNTER
----- Message from Viji Hernandez sent at 10/28/2022  9:06 AM CDT -----  Contact: [phone]  Pt is requesting a call back from pcp in regards to her appt with  . Please f/u

## 2022-11-01 ENCOUNTER — PATIENT MESSAGE (OUTPATIENT)
Dept: INTERNAL MEDICINE | Facility: CLINIC | Age: 47
End: 2022-11-01
Payer: COMMERCIAL

## 2022-11-01 DIAGNOSIS — E11.9 TYPE 2 DIABETES MELLITUS WITHOUT COMPLICATION, WITHOUT LONG-TERM CURRENT USE OF INSULIN: ICD-10-CM

## 2022-11-01 RX ORDER — SEMAGLUTIDE 1.34 MG/ML
1 INJECTION, SOLUTION SUBCUTANEOUS WEEKLY
Qty: 1 PEN | Refills: 3 | Status: SHIPPED | OUTPATIENT
Start: 2022-11-01 | End: 2022-11-01 | Stop reason: SDUPTHER

## 2022-11-01 RX ORDER — SEMAGLUTIDE 1.34 MG/ML
1 INJECTION, SOLUTION SUBCUTANEOUS WEEKLY
Qty: 1 PEN | Refills: 3 | Status: SHIPPED | OUTPATIENT
Start: 2022-11-01 | End: 2022-12-19

## 2022-11-01 NOTE — TELEPHONE ENCOUNTER
----- Message from Ann Talavera sent at 11/1/2022  8:38 AM CDT -----  Contact: Self/[phone]  Requesting an RX refill or new RX.  Is this a refill or new RX: New  RX name and strength :  semaglutide (OZEMPIC) 1 mg/dose (4 mg/3 mL)  Is this a 30 day or 90 day RX:   [facility] [address] [facility] [address]  Phone: [phone] Fax: [phone]      The doctors have asked that we provide their patients with the following 2 reminders -- prescription refills can take up to 72 hours, and a friendly reminder that in the future you can use your [facility] account to request refills:

## 2022-11-03 NOTE — TELEPHONE ENCOUNTER
Pt states she is down 29lbs and staying within glucose range,she has some lows at night but will discuss at next visit. Pt also hopes you feel better soon.

## 2022-11-21 ENCOUNTER — PATIENT MESSAGE (OUTPATIENT)
Dept: ADMINISTRATIVE | Facility: HOSPITAL | Age: 47
End: 2022-11-21
Payer: COMMERCIAL

## 2022-11-22 ENCOUNTER — PATIENT MESSAGE (OUTPATIENT)
Dept: ADMINISTRATIVE | Facility: HOSPITAL | Age: 47
End: 2022-11-22
Payer: COMMERCIAL

## 2022-11-22 ENCOUNTER — PATIENT OUTREACH (OUTPATIENT)
Dept: ADMINISTRATIVE | Facility: HOSPITAL | Age: 47
End: 2022-11-22
Payer: COMMERCIAL

## 2022-12-04 NOTE — PROGRESS NOTES
Nahed Mcginnis is a 47 y.o. female  who presents as a new patient for an annual exam.  Previously seeing Dr. Jiménez.   Menses generally occur monthly, lasting 7 days in duration, without intermenstrual bleeding.  Over the past year, she skipped several periods.  Denies hot flashes / flashes.  S/P laser to cervix more than 15 years ago with normal subsequent paps.  Reports fasting sugars 100-110s.  Her period began today ans she is having light bleeding.   Patient's last menstrual period was 2022.    22 Mammogram: Negative, TC 11.78%    Past Medical History:   Diagnosis Date    DM (diabetes mellitus)     Hypertension     Hypothyroidism        Past Surgical History:   Procedure Laterality Date     SECTION      DILATION AND CURETTAGE OF UTERUS      TONSILLECTOMY         OB History          1    Para   1    Term   1            AB        Living   1         SAB        IAB        Ectopic        Multiple        Live Births   1                 ROS:  GENERAL: Feeling well overall.   SKIN: Denies rash or lesions.   HEAD: Denies head injury or headache.   NODES: Denies enlarged lymph nodes.   CHEST: Denies chest pain or shortness of breath.   CARDIOVASCULAR: Denies palpitations or left sided chest pain.   ABDOMEN: No abdominal pain, nausea, vomiting or rectal bleeding.   URINARY: No dysuria or hematuria.  REPRODUCTIVE: See HPI.   BREASTS: Denies pain, lumps, or nipple discharge.   HEMATOLOGIC: No easy bruisability or excessive bleeding.   MUSCULOSKELETAL: Reports left shoulder discomfort.   NEUROLOGIC: Denies syncope or weakness.   PSYCHIATRIC: Denies depression.    PE:   (chaperone present during entire exam)  APPEARANCE: Well nourished, well developed, in no acute distress.  BREASTS: Symmetrical, no skin changes or visible lesions. No palpable masses, nipple discharge or adenopathy bilaterally.  ABDOMEN: Soft. No tenderness or masses. No hernias. No CVA tenderness.  VULVA: No  lesions. Normal female genitalia.  URETHRAL MEATUS: Normal size and location, no lesions, no prolapse.  URETHRA: No masses, tenderness, prolapse or scarring.  VAGINA: Moist and well rugated, minimal brown menstrual blood in vault, no significant cystocele or rectocele.  CERVIX: No lesions and discharge. PAP done.  UTERUS: Normal size, regular shape, mobile, non-tender, bladder base nontender.  ADNEXA: No masses, tenderness or CDS nodularity.  ANUS PERINEUM: Normal.      Diagnosis:  1. Women's annual routine gynecological examination    2. Pap smear for cervical cancer screening          PLAN:    Orders Placed This Encounter    HPV High Risk Genotypes, PCR    Liquid-Based Pap Smear, Screening       Patient was counseled today on perimenopausal issues and expected bleeding patterns.    Follow-up in 1 year.    Answers submitted by the patient for this visit:  Gynecologic Exam Questionnaire  (Submitted on 2022)  Chief Complaint: Gynecologic exam  genital itching: No  genital lesions: No  genital odor: No  genital rash: No  missed menses: No  pelvic pain: No  vaginal bleeding: No  vaginal discharge: No  Pregnant now?: No  abdominal pain: No  anorexia: No  back pain: No  chills: No  constipation: No  diarrhea: No  discolored urine: No  dysuria: No  fever: No  flank pain: No  frequency: No  headaches: No  hematuria: No  nausea: No  painful intercourse: No  rash: No  urgency: No  vomiting: No  Vaginal bleeding: heavier than menses  Passing clots?: No  Passing tissue?: No  Sexual activity: not sexually active  Partner with STD symptoms: no  STD: No  abdominal surgery: No   section: Yes  Ectopic pregnancy: No  Endometriosis: Yes  herpes simplex: No  gynecological surgery: No  menorrhagia: Yes  metrorrhagia: No  miscarriage: Yes  ovarian cysts: Yes  perineal abscess: No  PID: No  terminated pregnancy: No  vaginosis: No

## 2022-12-05 ENCOUNTER — OFFICE VISIT (OUTPATIENT)
Dept: OBSTETRICS AND GYNECOLOGY | Facility: CLINIC | Age: 47
End: 2022-12-05
Payer: COMMERCIAL

## 2022-12-05 ENCOUNTER — PATIENT MESSAGE (OUTPATIENT)
Dept: ADMINISTRATIVE | Facility: HOSPITAL | Age: 47
End: 2022-12-05
Payer: COMMERCIAL

## 2022-12-05 ENCOUNTER — PATIENT OUTREACH (OUTPATIENT)
Dept: ADMINISTRATIVE | Facility: HOSPITAL | Age: 47
End: 2022-12-05
Payer: COMMERCIAL

## 2022-12-05 VITALS
HEIGHT: 62 IN | BODY MASS INDEX: 31.64 KG/M2 | WEIGHT: 171.94 LBS | DIASTOLIC BLOOD PRESSURE: 68 MMHG | SYSTOLIC BLOOD PRESSURE: 120 MMHG

## 2022-12-05 DIAGNOSIS — Z12.4 PAP SMEAR FOR CERVICAL CANCER SCREENING: ICD-10-CM

## 2022-12-05 DIAGNOSIS — Z01.419 WOMEN'S ANNUAL ROUTINE GYNECOLOGICAL EXAMINATION: Primary | ICD-10-CM

## 2022-12-05 PROCEDURE — 88175 CYTOPATH C/V AUTO FLUID REDO: CPT | Performed by: PATHOLOGY

## 2022-12-05 PROCEDURE — 3074F SYST BP LT 130 MM HG: CPT | Mod: CPTII,S$GLB,, | Performed by: OBSTETRICS & GYNECOLOGY

## 2022-12-05 PROCEDURE — 3074F PR MOST RECENT SYSTOLIC BLOOD PRESSURE < 130 MM HG: ICD-10-PCS | Mod: CPTII,S$GLB,, | Performed by: OBSTETRICS & GYNECOLOGY

## 2022-12-05 PROCEDURE — 1160F PR REVIEW ALL MEDS BY PRESCRIBER/CLIN PHARMACIST DOCUMENTED: ICD-10-PCS | Mod: CPTII,S$GLB,, | Performed by: OBSTETRICS & GYNECOLOGY

## 2022-12-05 PROCEDURE — 99999 PR PBB SHADOW E&M-EST. PATIENT-LVL III: ICD-10-PCS | Mod: PBBFAC,,, | Performed by: OBSTETRICS & GYNECOLOGY

## 2022-12-05 PROCEDURE — 1160F RVW MEDS BY RX/DR IN RCRD: CPT | Mod: CPTII,S$GLB,, | Performed by: OBSTETRICS & GYNECOLOGY

## 2022-12-05 PROCEDURE — 3066F PR DOCUMENTATION OF TREATMENT FOR NEPHROPATHY: ICD-10-PCS | Mod: CPTII,S$GLB,, | Performed by: OBSTETRICS & GYNECOLOGY

## 2022-12-05 PROCEDURE — 99386 PR PREVENTIVE VISIT,NEW,40-64: ICD-10-PCS | Mod: S$GLB,,, | Performed by: OBSTETRICS & GYNECOLOGY

## 2022-12-05 PROCEDURE — 4010F PR ACE/ARB THEARPY RXD/TAKEN: ICD-10-PCS | Mod: CPTII,S$GLB,, | Performed by: OBSTETRICS & GYNECOLOGY

## 2022-12-05 PROCEDURE — 88141 CYTOPATH C/V INTERPRET: CPT | Mod: ,,, | Performed by: PATHOLOGY

## 2022-12-05 PROCEDURE — 3044F HG A1C LEVEL LT 7.0%: CPT | Mod: CPTII,S$GLB,, | Performed by: OBSTETRICS & GYNECOLOGY

## 2022-12-05 PROCEDURE — 1159F PR MEDICATION LIST DOCUMENTED IN MEDICAL RECORD: ICD-10-PCS | Mod: CPTII,S$GLB,, | Performed by: OBSTETRICS & GYNECOLOGY

## 2022-12-05 PROCEDURE — 1159F MED LIST DOCD IN RCRD: CPT | Mod: CPTII,S$GLB,, | Performed by: OBSTETRICS & GYNECOLOGY

## 2022-12-05 PROCEDURE — 3008F PR BODY MASS INDEX (BMI) DOCUMENTED: ICD-10-PCS | Mod: CPTII,S$GLB,, | Performed by: OBSTETRICS & GYNECOLOGY

## 2022-12-05 PROCEDURE — 3078F DIAST BP <80 MM HG: CPT | Mod: CPTII,S$GLB,, | Performed by: OBSTETRICS & GYNECOLOGY

## 2022-12-05 PROCEDURE — 88141 PR  CYTOPATH CERV/VAG INTERPRET: ICD-10-PCS | Mod: ,,, | Performed by: PATHOLOGY

## 2022-12-05 PROCEDURE — 3078F PR MOST RECENT DIASTOLIC BLOOD PRESSURE < 80 MM HG: ICD-10-PCS | Mod: CPTII,S$GLB,, | Performed by: OBSTETRICS & GYNECOLOGY

## 2022-12-05 PROCEDURE — 99386 PREV VISIT NEW AGE 40-64: CPT | Mod: S$GLB,,, | Performed by: OBSTETRICS & GYNECOLOGY

## 2022-12-05 PROCEDURE — 3066F NEPHROPATHY DOC TX: CPT | Mod: CPTII,S$GLB,, | Performed by: OBSTETRICS & GYNECOLOGY

## 2022-12-05 PROCEDURE — 3044F PR MOST RECENT HEMOGLOBIN A1C LEVEL <7.0%: ICD-10-PCS | Mod: CPTII,S$GLB,, | Performed by: OBSTETRICS & GYNECOLOGY

## 2022-12-05 PROCEDURE — 99999 PR PBB SHADOW E&M-EST. PATIENT-LVL III: CPT | Mod: PBBFAC,,, | Performed by: OBSTETRICS & GYNECOLOGY

## 2022-12-05 PROCEDURE — 3008F BODY MASS INDEX DOCD: CPT | Mod: CPTII,S$GLB,, | Performed by: OBSTETRICS & GYNECOLOGY

## 2022-12-05 PROCEDURE — 87624 HPV HI-RISK TYP POOLED RSLT: CPT | Performed by: OBSTETRICS & GYNECOLOGY

## 2022-12-05 PROCEDURE — 3061F NEG MICROALBUMINURIA REV: CPT | Mod: CPTII,S$GLB,, | Performed by: OBSTETRICS & GYNECOLOGY

## 2022-12-05 PROCEDURE — 4010F ACE/ARB THERAPY RXD/TAKEN: CPT | Mod: CPTII,S$GLB,, | Performed by: OBSTETRICS & GYNECOLOGY

## 2022-12-05 PROCEDURE — 3061F PR NEG MICROALBUMINURIA RESULT DOCUMENTED/REVIEW: ICD-10-PCS | Mod: CPTII,S$GLB,, | Performed by: OBSTETRICS & GYNECOLOGY

## 2022-12-06 ENCOUNTER — PATIENT MESSAGE (OUTPATIENT)
Dept: OBSTETRICS AND GYNECOLOGY | Facility: CLINIC | Age: 47
End: 2022-12-06
Payer: COMMERCIAL

## 2022-12-07 ENCOUNTER — TELEPHONE (OUTPATIENT)
Dept: INTERNAL MEDICINE | Facility: CLINIC | Age: 47
End: 2022-12-07
Payer: COMMERCIAL

## 2022-12-07 ENCOUNTER — TELEPHONE (OUTPATIENT)
Dept: DIABETES | Facility: CLINIC | Age: 47
End: 2022-12-07
Payer: COMMERCIAL

## 2022-12-07 NOTE — TELEPHONE ENCOUNTER
Reached out to pt scheduled visit, pt stated she has a kalani 2 but does not remember her login info to be able to share her data , stated [facility] has information because that is who set it up , reached out to [facility]'s staff awaiting response

## 2022-12-07 NOTE — TELEPHONE ENCOUNTER
----- Message from Doreen Rodriguez sent at 12/7/2022  3:45 PM CST -----  Contact: [phone]  Patient is returning a phone call.  Who left a message for the patient: SAVANAH Ríos's office  Does patient know what this is regarding:  yes  Would you like a call back, or a response through your [facility] portal?:   phone  Comments:

## 2022-12-07 NOTE — TELEPHONE ENCOUNTER
----- Message from Ginny Marinelli sent at 12/7/2022  3:25 PM CST -----  Contact: Nahed potter [phone]  Patient is returning a phone call.  Who left a message for the patient: Stefania  Does patient know what this is regarding:    Would you like a call back, or a response through your [facility] portal?: call back  Comments:  Pt was returning the nurses call

## 2022-12-09 ENCOUNTER — TELEPHONE (OUTPATIENT)
Dept: OBSTETRICS AND GYNECOLOGY | Facility: CLINIC | Age: 47
End: 2022-12-09

## 2022-12-09 ENCOUNTER — TELEPHONE (OUTPATIENT)
Dept: OBSTETRICS AND GYNECOLOGY | Facility: CLINIC | Age: 47
End: 2022-12-09
Payer: COMMERCIAL

## 2022-12-09 NOTE — TELEPHONE ENCOUNTER
----- Message from Yasmine Patiño sent at 12/9/2022  1:39 PM CST -----  Regarding: Return call    Type:  Patient Returning Call    Who Called:  ANTHONY YOUNG [[id number]]    Who Left Message for Patient:  Conrado Estevez    Does the patient know what this is regarding?: Y    Can the clinic reply in [facility]: No    Best Call Back Number: [phone]    Additional Information: N/A

## 2022-12-13 ENCOUNTER — PATIENT MESSAGE (OUTPATIENT)
Dept: OBSTETRICS AND GYNECOLOGY | Facility: CLINIC | Age: 47
End: 2022-12-13
Payer: COMMERCIAL

## 2022-12-13 LAB
FINAL PATHOLOGIC DIAGNOSIS: NORMAL
HPV HR 12 DNA SPEC QL NAA+PROBE: NEGATIVE
HPV16 AG SPEC QL: NEGATIVE
HPV18 DNA SPEC QL NAA+PROBE: NEGATIVE
Lab: NORMAL

## 2022-12-14 ENCOUNTER — TELEPHONE (OUTPATIENT)
Dept: OBSTETRICS AND GYNECOLOGY | Facility: CLINIC | Age: 47
End: 2022-12-14

## 2022-12-19 ENCOUNTER — OFFICE VISIT (OUTPATIENT)
Dept: DIABETES | Facility: CLINIC | Age: 47
End: 2022-12-19
Payer: COMMERCIAL

## 2022-12-19 ENCOUNTER — TELEPHONE (OUTPATIENT)
Dept: DIABETES | Facility: CLINIC | Age: 47
End: 2022-12-19

## 2022-12-19 VITALS — WEIGHT: 162 LBS | DIASTOLIC BLOOD PRESSURE: 87 MMHG | BODY MASS INDEX: 29.63 KG/M2 | SYSTOLIC BLOOD PRESSURE: 120 MMHG

## 2022-12-19 DIAGNOSIS — E11.65 UNCONTROLLED TYPE 2 DIABETES MELLITUS WITH HYPERGLYCEMIA: ICD-10-CM

## 2022-12-19 DIAGNOSIS — Z71.9 HEALTH EDUCATION/COUNSELING: ICD-10-CM

## 2022-12-19 DIAGNOSIS — E66.3 OVERWEIGHT (BMI 25.0-29.9): ICD-10-CM

## 2022-12-19 DIAGNOSIS — E03.9 HYPOTHYROIDISM, UNSPECIFIED TYPE: ICD-10-CM

## 2022-12-19 DIAGNOSIS — I10 PRIMARY HYPERTENSION: Chronic | ICD-10-CM

## 2022-12-19 DIAGNOSIS — E11.9 TYPE 2 DIABETES MELLITUS WITHOUT COMPLICATION, WITHOUT LONG-TERM CURRENT USE OF INSULIN: Primary | ICD-10-CM

## 2022-12-19 PROCEDURE — 3074F PR MOST RECENT SYSTOLIC BLOOD PRESSURE < 130 MM HG: ICD-10-PCS | Mod: CPTII,95,, | Performed by: NURSE PRACTITIONER

## 2022-12-19 PROCEDURE — 3061F PR NEG MICROALBUMINURIA RESULT DOCUMENTED/REVIEW: ICD-10-PCS | Mod: CPTII,95,, | Performed by: NURSE PRACTITIONER

## 2022-12-19 PROCEDURE — 3008F PR BODY MASS INDEX (BMI) DOCUMENTED: ICD-10-PCS | Mod: CPTII,95,, | Performed by: NURSE PRACTITIONER

## 2022-12-19 PROCEDURE — 3079F DIAST BP 80-89 MM HG: CPT | Mod: CPTII,95,, | Performed by: NURSE PRACTITIONER

## 2022-12-19 PROCEDURE — 3079F PR MOST RECENT DIASTOLIC BLOOD PRESSURE 80-89 MM HG: ICD-10-PCS | Mod: CPTII,95,, | Performed by: NURSE PRACTITIONER

## 2022-12-19 PROCEDURE — 3066F PR DOCUMENTATION OF TREATMENT FOR NEPHROPATHY: ICD-10-PCS | Mod: CPTII,95,, | Performed by: NURSE PRACTITIONER

## 2022-12-19 PROCEDURE — 3066F NEPHROPATHY DOC TX: CPT | Mod: CPTII,95,, | Performed by: NURSE PRACTITIONER

## 2022-12-19 PROCEDURE — 3008F BODY MASS INDEX DOCD: CPT | Mod: CPTII,95,, | Performed by: NURSE PRACTITIONER

## 2022-12-19 PROCEDURE — 3074F SYST BP LT 130 MM HG: CPT | Mod: CPTII,95,, | Performed by: NURSE PRACTITIONER

## 2022-12-19 PROCEDURE — 1159F MED LIST DOCD IN RCRD: CPT | Mod: CPTII,95,, | Performed by: NURSE PRACTITIONER

## 2022-12-19 PROCEDURE — 3044F PR MOST RECENT HEMOGLOBIN A1C LEVEL <7.0%: ICD-10-PCS | Mod: CPTII,95,, | Performed by: NURSE PRACTITIONER

## 2022-12-19 PROCEDURE — 3044F HG A1C LEVEL LT 7.0%: CPT | Mod: CPTII,95,, | Performed by: NURSE PRACTITIONER

## 2022-12-19 PROCEDURE — 99214 PR OFFICE/OUTPT VISIT, EST, LEVL IV, 30-39 MIN: ICD-10-PCS | Mod: 95,,, | Performed by: NURSE PRACTITIONER

## 2022-12-19 PROCEDURE — 4010F PR ACE/ARB THEARPY RXD/TAKEN: ICD-10-PCS | Mod: CPTII,95,, | Performed by: NURSE PRACTITIONER

## 2022-12-19 PROCEDURE — 3061F NEG MICROALBUMINURIA REV: CPT | Mod: CPTII,95,, | Performed by: NURSE PRACTITIONER

## 2022-12-19 PROCEDURE — 1160F RVW MEDS BY RX/DR IN RCRD: CPT | Mod: CPTII,95,, | Performed by: NURSE PRACTITIONER

## 2022-12-19 PROCEDURE — 4010F ACE/ARB THERAPY RXD/TAKEN: CPT | Mod: CPTII,95,, | Performed by: NURSE PRACTITIONER

## 2022-12-19 PROCEDURE — 99214 OFFICE O/P EST MOD 30 MIN: CPT | Mod: 95,,, | Performed by: NURSE PRACTITIONER

## 2022-12-19 PROCEDURE — 1160F PR REVIEW ALL MEDS BY PRESCRIBER/CLIN PHARMACIST DOCUMENTED: ICD-10-PCS | Mod: CPTII,95,, | Performed by: NURSE PRACTITIONER

## 2022-12-19 PROCEDURE — 1159F PR MEDICATION LIST DOCUMENTED IN MEDICAL RECORD: ICD-10-PCS | Mod: CPTII,95,, | Performed by: NURSE PRACTITIONER

## 2022-12-19 RX ORDER — FLASH GLUCOSE SENSOR
1 KIT MISCELLANEOUS
Qty: 2 KIT | Refills: 11 | Status: SHIPPED | OUTPATIENT
Start: 2022-12-19 | End: 2023-06-19 | Stop reason: SDUPTHER

## 2022-12-19 RX ORDER — SEMAGLUTIDE 2.68 MG/ML
2 INJECTION, SOLUTION SUBCUTANEOUS
Qty: 3 ML | Refills: 6 | Status: SHIPPED | OUTPATIENT
Start: 2022-12-19 | End: 2023-06-19

## 2022-12-19 NOTE — ASSESSMENT & PLAN NOTE
TSH WNL, on LT4 50 mcg, reinforced take by itself on empty stomach, first thing in the morning and wait at least 30-60 minutes to eat, drink, or take other medications.

## 2022-12-19 NOTE — PROGRESS NOTES
The patient location is: [facility]   The chief complaint leading to consultation is: Diabetes management- follow up     Visit type: audiovisual    Face to Face time with patient: 35 minutes   45 minutes of total time spent on the encounter, which includes face to face time and non-face to face time preparing to see the patient (eg, review of tests), Obtaining and/or reviewing separately obtained history, Documenting clinical information in the electronic or other health record, Independently interpreting results (not separately reported) and communicating results to the patient/family/caregiver, or Care coordination (not separately reported).         Each patient to whom he or she provides medical services by telemedicine is:  (1) informed of the relationship between the physician and patient and the respective role of any other health care provider with respect to management of the patient; and (2) notified that he or she may decline to receive medical services by telemedicine and may withdraw from such care at any time.    Notes:      CC:   Chief Complaint   Patient presents with    Diabetes Mellitus       HPI: Nahed Mcginnis is a 47 y.o. female presents for an initial visit today for the management of T2DM.  This is her first visit with me.   She was previously seen by Niki Ríos NP- last seen in June 2022       She was diagnosed with Type 2 diabetes in 3-4 year ago on routine labs   Prediabetes over 10 years     Family hx of diabetes: brothers, father   Hospitalized for diabetes: denies   Insulin therapy: denies     No personal or FH of thyroid cancer or personal of pancreatic cancer or pancreatitis.     Last visit with Narda in June of 2022 they increased her Ozempic to 1 mg weekly  Her A1c in August was down to 6.7%  38# weight loss since she started Ozempic  Denies any GI upset  She is wearing a freestyle Manoj--See attached download  She lives in [facility]      DIABETES COMPLICATIONS: none       Diabetes Management Status    ASA:  No    Statin: Taking-- Lipitor 40 mg   ACE/ARB:  Taking lisinopril 10 mg     Screening or Prevention Patient's value Goal Complete/Controlled?   HgA1C Testing and Control   Lab Results   Component Value Date    HGBA1C 6.7 (H) 08/22/2022      Annually/Less than 8% Yes     Lipid profile : 05/16/2022 Annually Yes     LDL control Lab Results   Component Value Date    LDLCALC 74.8 05/16/2022    Annually/Less than 100 mg/dl  Yes     Nephropathy screening Lab Results   Component Value Date    LABMICR 6.0 05/16/2022     Lab Results   Component Value Date    PROTEINUA Negative 02/04/2021    Annually Yes     Blood pressure BP Readings from Last 1 Encounters:   12/19/22 120/87    Less than 140/90 Yes     Dilated retinal exam Most Recent Eye Exam Date: Not Found Annually Yes     Foot exam   : 12/19/2022 Annually Yes         CURRENT A1C:    Hemoglobin A1C   Date Value Ref Range Status   08/22/2022 6.7 (H) 4.0 - 5.6 % Final     Comment:     ADA Screening Guidelines:  5.7-6.4%  Consistent with prediabetes  >or=6.5%  Consistent with diabetes    High levels of fetal hemoglobin interfere with the HbA1C  assay. Heterozygous hemoglobin variants (HbS, HgC, etc)do  not significantly interfere with this assay.   However, presence of multiple variants may affect accuracy.     05/16/2022 7.5 (H) 4.0 - 5.6 % Final     Comment:     ADA Screening Guidelines:  5.7-6.4%  Consistent with prediabetes  >or=6.5%  Consistent with diabetes    High levels of fetal hemoglobin interfere with the HbA1C  assay. Heterozygous hemoglobin variants (HbS, HgC, etc)do  not significantly interfere with this assay.   However, presence of multiple variants may affect accuracy.         GOAL A1C: 6.5% without hypoglycemia       DM MEDICATIONS USED IN THE PAST: Ozempic   Metformin - gi side effects.   jardiance - yeast infections. Stopped in 2021 -  DPP4 - Januvia  SGLT2 - jardiance - just restarted recently - however has new  yeast infection.   Glimepiride 4mg with breakfast.  Ozempic       CURRENT DIABETES MEDICATIONS: Ozempic 1 mg weekly on Saturday AM   Insulin: N/A    Missed doses: denies       BLOOD GLUCOSE MONITORING:    Sensor type: Freestyle kalani 2   Average BG readin  Estimated A1c is 5.9%   Time in range: 100%  Site change:  q14 days.            Sensor was downloaded in clinic today and reviewed with patient.   Please see attached document for download.        HYPOGLYCEMIA:  once overnight read down to 50's on kalani   Did not confirm with a fingerstick       MEALS: eating 2 meals per day   BF: coffee   Lunch: 1/2 sandwich or grilled chicken or left overs   Dinner: skip usually -- may just snack on fruit or yogurt   Snack: fruit or peanuts   Drinks: water        CURRENT EXERCISE:  No      Review of Systems  Review of Systems   Constitutional:  Negative for appetite change, fatigue and unexpected weight change.   HENT:  Negative for trouble swallowing.    Eyes:  Negative for visual disturbance.   Respiratory:  Negative for shortness of breath.    Cardiovascular:  Negative for chest pain.   Gastrointestinal:  Negative for nausea.   Endocrine: Negative for polydipsia, polyphagia and polyuria.   Genitourinary:         No Nocturia    Skin:  Negative for wound.   Neurological:  Negative for numbness.       Physical Exam   Physical Exam  Constitutional:       General: She is not in acute distress.     Appearance: Normal appearance. She is not ill-appearing.      Comments: Overweight female    HENT:      Head: Normocephalic and atraumatic.   Pulmonary:      Effort: Pulmonary effort is normal.   Musculoskeletal:      Cervical back: Normal range of motion.   Neurological:      Mental Status: She is alert.   Psychiatric:         Mood and Affect: Mood normal.         Behavior: Behavior normal.         Thought Content: Thought content normal.         Judgment: Judgment normal.       FOOT EXAMINATION: deferred virtual       Lab  Results   Component Value Date    TSH 1.832 08/22/2022           Type 2 diabetes mellitus without complication, without long-term current use of insulin  Controlled   Blood sugar readings are well controlled on her freestyle Manoj download  Will check an A1c  Last A1c well controlled  Has lost 38 lb on Ozempic  Expresses wants to lose more weight        -- Medication Changes:   Increase Ozempic to 2 mg weekly      -- Reviewed goals of therapy are to get the best control we can without hypoglycemia.  -- Reviewed patient's current insulin regimen. Clarified proper insulin dose and timing in relation to meals, etc. Insulin injection sites and proper rotation instructed.    -- Advised frequent self blood glucose monitoring.  Patient encouraged to document glucose results and bring them to every clinic visit.  Continue freestyle Manoj 2.  Offered to change to Manoj 3.  She deferred and would like to continue with freestyle Manoj 2  -- Hypoglycemia precautions discussed. Instructed on precautions before driving.    -- Call for Bg repeatedly < 70 or > 180.   -- Close adherence to lifestyle changes recommended.   -- Periodic follow ups for eye evaluations, foot care and dental care suggested.          HTN (hypertension)  BP goal is < 140/90.   Tolerating ACEi  Controlled   Blood pressure goals discussed with patient        Hypothyroidism  TSH WNL, on LT4 50 mcg, reinforced take by itself on empty stomach, first thing in the morning and wait at least 30-60 minutes to eat, drink, or take other medications.            Overweight (BMI 25.0-29.9)  Body mass index is 29.63 kg/m².  Increases insulin resistance.   Discussed DM diet and exercise.       Spent 35 minutes with patient with > 50% time spent in counseling, as noted above on Ozempic and freestyle Manoj as well as diet      Follow up in about 6 months (around 6/19/2023).  Will get lab work done now  Follow-up with me in 6 months okay to be virtual      Orders Placed This  Encounter   Procedures    Basic Metabolic Panel     Standing Status:   Future     Standing Expiration Date:   2/17/2024    Lipid Panel     Standing Status:   Future     Standing Expiration Date:   2/17/2024    TSH     Standing Status:   Future     Standing Expiration Date:   2/17/2024    Hemoglobin A1C     Standing Status:   Future     Standing Expiration Date:   2/17/2024    Microalbumin/Creatinine Ratio, Urine     Standing Status:   Future     Standing Expiration Date:   6/19/2024     Order Specific Question:   Specimen Source     Answer:   Urine       Recommendations were discussed with the patient in detail  The patient verbalized understanding and agrees with the plan outlined as above.     This note was partly generated with [employer] voice recognition software. I apologize for any possible typographical errors.

## 2022-12-19 NOTE — ASSESSMENT & PLAN NOTE
Controlled   Blood sugar readings are well controlled on her freestyle Maonj download  Will check an A1c  Last A1c well controlled  Has lost 38 lb on Ozempic  Expresses wants to lose more weight        -- Medication Changes:   Increase Ozempic to 2 mg weekly      -- Reviewed goals of therapy are to get the best control we can without hypoglycemia.  -- Reviewed patient's current insulin regimen. Clarified proper insulin dose and timing in relation to meals, etc. Insulin injection sites and proper rotation instructed.    -- Advised frequent self blood glucose monitoring.  Patient encouraged to document glucose results and bring them to every clinic visit.  Continue freestyle Manoj 2.  Offered to change to Manoj 3.  She deferred and would like to continue with freestyle Manoj 2  -- Hypoglycemia precautions discussed. Instructed on precautions before driving.    -- Call for Bg repeatedly < 70 or > 180.   -- Close adherence to lifestyle changes recommended.   -- Periodic follow ups for eye evaluations, foot care and dental care suggested.

## 2022-12-19 NOTE — Clinical Note
Please reach out to schedule lab work now--- 1 day when she can come fasting--she will come to [place] Follow-up with me in 6 months--okay to be virtual--but we will need to figure out her Manoj information so we can see her data--she is going to transfer care over here as she lives down here

## 2022-12-19 NOTE — ASSESSMENT & PLAN NOTE
Body mass index is 29.63 kg/m².  Increases insulin resistance.   Discussed DM diet and exercise.

## 2022-12-19 NOTE — ASSESSMENT & PLAN NOTE
BP goal is < 140/90.   Tolerating ACEi  Controlled   Blood pressure goals discussed with patient

## 2022-12-22 ENCOUNTER — TELEPHONE (OUTPATIENT)
Dept: DIABETES | Facility: CLINIC | Age: 47
End: 2022-12-22
Payer: COMMERCIAL

## 2022-12-22 DIAGNOSIS — E11.9 TYPE 2 DIABETES MELLITUS WITHOUT COMPLICATION, WITHOUT LONG-TERM CURRENT USE OF INSULIN: Primary | ICD-10-CM

## 2023-01-04 ENCOUNTER — PATIENT MESSAGE (OUTPATIENT)
Dept: DIABETES | Facility: CLINIC | Age: 48
End: 2023-01-04
Payer: COMMERCIAL

## 2023-01-26 ENCOUNTER — PATIENT OUTREACH (OUTPATIENT)
Dept: ADMINISTRATIVE | Facility: HOSPITAL | Age: 48
End: 2023-01-26
Payer: COMMERCIAL

## 2023-01-26 NOTE — LETTER
AUTHORIZATION FOR RELEASE OF   CONFIDENTIAL INFORMATION        We are seeing Nahed Mcginnis, date of birth 1975, in the clinic at [facility]. Fausto Hammonds MD is the patient's PCP. Nahed Mcginnis has an outstanding lab/procedure at the time we reviewed her chart. In order to help keep her health information updated, she has authorized us to request the following medical record(s):        (  )  MAMMOGRAM                                      (  )  COLONOSCOPY      (  )  PAP SMEAR                                          (  )  OUTSIDE LAB RESULTS     (  )  DEXA SCAN                                          (x  )  EYE EXAM            (  )  FOOT EXAM                                          (  )  ENTIRE RECORD     (  )  OUTSIDE IMMUNIZATIONS                 (  )  _______________         Please fax records to [facility], Tarek C Balamane, MD, [phone]     If you have any questions, please contact Navya at [phone].           Patient Name: Nahed Mcginnis  : 1975  Patient Phone #: [phone]

## 2023-01-27 ENCOUNTER — PATIENT OUTREACH (OUTPATIENT)
Dept: ADMINISTRATIVE | Facility: HOSPITAL | Age: 48
End: 2023-01-27
Payer: COMMERCIAL

## 2023-01-27 NOTE — PROGRESS NOTES
Patient due for the following    TETANUS VACCINE     Low Dose Statin     COVID-19 Vaccine (4 - Booster for Pfizer series)    Colorectal Cancer Screening     Eye Exam     Pneumococcal Vaccines (Age 0-64) (2 - PCV)      Patient due for dilated eye exam to check for dm retinopathy-not documented on previous eye exam(2022).  External optometrist-Dr. Birmingham-due 2/2023    Immunizations: reviewed and updated  Care Everywhere: triggered  Care Teams: up to date  Outreach: none needed

## 2023-03-09 ENCOUNTER — OFFICE VISIT (OUTPATIENT)
Dept: URGENT CARE | Facility: CLINIC | Age: 48
End: 2023-03-09
Payer: COMMERCIAL

## 2023-03-09 VITALS
HEIGHT: 62 IN | DIASTOLIC BLOOD PRESSURE: 77 MMHG | TEMPERATURE: 99 F | RESPIRATION RATE: 16 BRPM | SYSTOLIC BLOOD PRESSURE: 115 MMHG | WEIGHT: 162 LBS | BODY MASS INDEX: 29.81 KG/M2 | OXYGEN SATURATION: 97 % | HEART RATE: 83 BPM

## 2023-03-09 DIAGNOSIS — J04.0 LARYNGITIS: Primary | ICD-10-CM

## 2023-03-09 DIAGNOSIS — J20.9 ACUTE BRONCHITIS, UNSPECIFIED ORGANISM: ICD-10-CM

## 2023-03-09 PROCEDURE — 99214 PR OFFICE/OUTPT VISIT, EST, LEVL IV, 30-39 MIN: ICD-10-PCS | Mod: 25,S$GLB,, | Performed by: FAMILY MEDICINE

## 2023-03-09 PROCEDURE — 96372 THER/PROPH/DIAG INJ SC/IM: CPT | Mod: S$GLB,,, | Performed by: FAMILY MEDICINE

## 2023-03-09 PROCEDURE — 99214 OFFICE O/P EST MOD 30 MIN: CPT | Mod: 25,S$GLB,, | Performed by: FAMILY MEDICINE

## 2023-03-09 PROCEDURE — 96372 PR INJECTION,THERAP/PROPH/DIAG2ST, IM OR SUBCUT: ICD-10-PCS | Mod: S$GLB,,, | Performed by: FAMILY MEDICINE

## 2023-03-09 RX ORDER — AMOXICILLIN AND CLAVULANATE POTASSIUM 875; 125 MG/1; MG/1
1 TABLET, FILM COATED ORAL EVERY 12 HOURS
Qty: 14 TABLET | Refills: 0 | Status: SHIPPED | OUTPATIENT
Start: 2023-03-09 | End: 2023-03-16

## 2023-03-09 RX ORDER — ALBUTEROL SULFATE 90 UG/1
2 AEROSOL, METERED RESPIRATORY (INHALATION) EVERY 6 HOURS PRN
Qty: 18 G | Refills: 0 | Status: SHIPPED | OUTPATIENT
Start: 2023-03-09 | End: 2023-05-04 | Stop reason: SDUPTHER

## 2023-03-09 RX ORDER — METHYLPREDNISOLONE 4 MG/1
TABLET ORAL
Qty: 21 EACH | Refills: 0 | Status: SHIPPED | OUTPATIENT
Start: 2023-03-09 | End: 2023-05-04 | Stop reason: DRUGHIGH

## 2023-03-09 NOTE — PROGRESS NOTES
"Subjective:       Patient ID: Nahed Mcginnis is a 47 y.o. female.    Vitals:  height is 5' 2" (1.575 m) and weight is 73.5 kg (162 lb). Her oral temperature is 98.5 °F (36.9 °C). Her blood pressure is 115/77 and her pulse is 83. Her respiration is 16 and oxygen saturation is 97%.     Chief Complaint: Sore Throat    This is a 47 y.o. female who presents today with a chief complaint of sore throat. Patient's symptoms started on yesterday. Patient has a sore throat and loss of voice. Patient is unable to swallow.      Sore Throat   This is a new problem. The current episode started yesterday. The problem has been gradually worsening. Sore throat worse side: general. There has been no fever. The pain is at a severity of 4/10. Associated symptoms include a hoarse voice and trouble swallowing. Treatments tried: cough drops. The treatment provided no relief.     HENT:  Positive for sore throat and trouble swallowing.      Objective:      Physical Exam   Constitutional: She is oriented to person, place, and time.   HENT:   Head: Normocephalic and atraumatic.   Nose: No congestion.   Mouth/Throat: Posterior oropharyngeal erythema present. No oropharyngeal exudate.      Comments: No tonsillar or uvular swelling or uvular shift . no stridor. No neck swelling  Pulmonary/Chest: No respiratory distress.   Abdominal: Normal appearance.   Neurological: no focal deficit. She is alert and oriented to person, place, and time.   Psychiatric: Her behavior is normal. Mood, judgment and thought content normal.       Assessment:       1. Laryngitis    2. Acute bronchitis, unspecified organism          Plan:         Laryngitis  -     Discontinue: methylPREDNISolone sod suc(PF) injection 125 mg  -     methylPREDNISolone sod suc(PF) injection 125 mg  -     methylPREDNISolone (MEDROL DOSEPACK) 4 mg tablet; use as directed  Dispense: 21 each; Refill: 0  -     amoxicillin-clavulanate 875-125mg (AUGMENTIN) 875-125 mg per tablet; Take 1 tablet by " mouth every 12 (twelve) hours. for 7 days  Dispense: 14 tablet; Refill: 0    Acute bronchitis, unspecified organism  -     Discontinue: methylPREDNISolone sod suc(PF) injection 125 mg  -     methylPREDNISolone sod suc(PF) injection 125 mg  -     methylPREDNISolone (MEDROL DOSEPACK) 4 mg tablet; use as directed  Dispense: 21 each; Refill: 0  -     amoxicillin-clavulanate 875-125mg (AUGMENTIN) 875-125 mg per tablet; Take 1 tablet by mouth every 12 (twelve) hours. for 7 days  Dispense: 14 tablet; Refill: 0  -     albuterol (VENTOLIN HFA) 90 mcg/actuation inhaler; Inhale 2 puffs into the lungs every 6 (six) hours as needed for Wheezing. Rescue  Dispense: 18 g; Refill: 0    Pt advised: Rest and hydrate  Steroid shot in clinic  Continue with oral steroid in 8 hours (methyl prednisolone medrol dose pack.)  Antibiotic and inhaler as prescribed  Cool mist humidifier ok if you have at home    If ANY worsening of symptoms such as throat swelling sensation worsening or difficulty swallowing or breathing, call 911 and go to ER immediately!!

## 2023-03-09 NOTE — PATIENT INSTRUCTIONS
Rest and hydrate  Steroid shot in clinic  Continue with oral steroid in 8 hours (methyl prednisolone medrol dose pack.)  Antibiotic and inhaler as prescribed  Cool mist humidifier ok if you have at home    If ANY worsening of symptoms such as throat swelling sensation worsening or difficulty swallowing or breathing, call 911 and go to ER immediately!!

## 2023-03-22 LAB
LEFT EYE DM RETINOPATHY: NEGATIVE
RIGHT EYE DM RETINOPATHY: NEGATIVE

## 2023-04-20 ENCOUNTER — PATIENT MESSAGE (OUTPATIENT)
Dept: ADMINISTRATIVE | Facility: HOSPITAL | Age: 48
End: 2023-04-20
Payer: COMMERCIAL

## 2023-04-20 ENCOUNTER — PATIENT OUTREACH (OUTPATIENT)
Dept: ADMINISTRATIVE | Facility: HOSPITAL | Age: 48
End: 2023-04-20
Payer: COMMERCIAL

## 2023-04-20 DIAGNOSIS — Z12.31 ENCOUNTER FOR SCREENING MAMMOGRAM FOR MALIGNANT NEOPLASM OF BREAST: Primary | ICD-10-CM

## 2023-04-20 NOTE — PROGRESS NOTES
Health Maintenance Due   Topic Date Due    TETANUS VACCINE  Never done    Low Dose Statin  Never done    COVID-19 Vaccine (4 - Booster for Pfizer series) 02/09/2022    Colorectal Cancer Screening  Never done    Eye Exam  02/14/2023    Pneumococcal Vaccines (Age 0-64) (2 - PCV) 02/16/2023    Mammogram  05/16/2023       Chart reviewed.   Immunizations: Reconciled  Orders placed: Mammogram  Upcoming appts to satisfy SUSHIL topics: Labs 6/12/2023  Delayed portal message sent for Mammogram scheduling.  Eye exam requested from Dr. Lenin Birmingham.

## 2023-04-20 NOTE — LETTER
April 20, 2023                     [facility]  [address]  [facility] [address]  Phone: [phone]   Patient: Nahed Mcginnis   MR Number: [id number]   YOB: 1975           Dear Dr. Lenin Birmingham    Nahed Mcginnis is a patient of Dr. Fausto Hammonds (PCP) at [facility]. While reviewing her chart, it has come to our attention that there is an outstanding procedure. Please help keep our Health Maintenance records as accurate and as up to date as possible by supplying the following:    Eye exam   2023                                                                          Please fax to [facility] @ [phone].    Thank you for your assistance in our patient's healthcare.     Sincerely,     Ginny De La Vega, [facility]   Panel Care Coordinator  Population Health

## 2023-04-21 ENCOUNTER — PATIENT MESSAGE (OUTPATIENT)
Dept: ADMINISTRATIVE | Facility: HOSPITAL | Age: 48
End: 2023-04-21
Payer: COMMERCIAL

## 2023-04-24 ENCOUNTER — PATIENT MESSAGE (OUTPATIENT)
Dept: INTERNAL MEDICINE | Facility: CLINIC | Age: 48
End: 2023-04-24
Payer: COMMERCIAL

## 2023-05-04 ENCOUNTER — OFFICE VISIT (OUTPATIENT)
Dept: INTERNAL MEDICINE | Facility: CLINIC | Age: 48
End: 2023-05-04
Payer: COMMERCIAL

## 2023-05-04 VITALS
TEMPERATURE: 98 F | OXYGEN SATURATION: 98 % | SYSTOLIC BLOOD PRESSURE: 128 MMHG | HEART RATE: 72 BPM | WEIGHT: 154.31 LBS | DIASTOLIC BLOOD PRESSURE: 82 MMHG | HEIGHT: 62 IN | RESPIRATION RATE: 16 BRPM | BODY MASS INDEX: 28.39 KG/M2

## 2023-05-04 DIAGNOSIS — E11.9 TYPE 2 DIABETES MELLITUS WITHOUT COMPLICATION, WITHOUT LONG-TERM CURRENT USE OF INSULIN: ICD-10-CM

## 2023-05-04 DIAGNOSIS — E03.9 HYPOTHYROIDISM, UNSPECIFIED TYPE: ICD-10-CM

## 2023-05-04 DIAGNOSIS — I10 HYPERTENSION, UNSPECIFIED TYPE: ICD-10-CM

## 2023-05-04 DIAGNOSIS — J20.9 ACUTE BRONCHITIS, UNSPECIFIED ORGANISM: ICD-10-CM

## 2023-05-04 DIAGNOSIS — Z00.00 PHYSICAL EXAM, ANNUAL: Primary | ICD-10-CM

## 2023-05-04 PROCEDURE — 3008F BODY MASS INDEX DOCD: CPT | Mod: CPTII,S$GLB,, | Performed by: STUDENT IN AN ORGANIZED HEALTH CARE EDUCATION/TRAINING PROGRAM

## 2023-05-04 PROCEDURE — 4010F PR ACE/ARB THEARPY RXD/TAKEN: ICD-10-PCS | Mod: CPTII,S$GLB,, | Performed by: STUDENT IN AN ORGANIZED HEALTH CARE EDUCATION/TRAINING PROGRAM

## 2023-05-04 PROCEDURE — 90471 PNEUMOCOCCAL CONJUGATE VACCINE 20-VALENT: ICD-10-PCS | Mod: S$GLB,,, | Performed by: STUDENT IN AN ORGANIZED HEALTH CARE EDUCATION/TRAINING PROGRAM

## 2023-05-04 PROCEDURE — 4010F ACE/ARB THERAPY RXD/TAKEN: CPT | Mod: CPTII,S$GLB,, | Performed by: STUDENT IN AN ORGANIZED HEALTH CARE EDUCATION/TRAINING PROGRAM

## 2023-05-04 PROCEDURE — 3079F PR MOST RECENT DIASTOLIC BLOOD PRESSURE 80-89 MM HG: ICD-10-PCS | Mod: CPTII,S$GLB,, | Performed by: STUDENT IN AN ORGANIZED HEALTH CARE EDUCATION/TRAINING PROGRAM

## 2023-05-04 PROCEDURE — 99396 PREV VISIT EST AGE 40-64: CPT | Mod: 25,S$GLB,, | Performed by: STUDENT IN AN ORGANIZED HEALTH CARE EDUCATION/TRAINING PROGRAM

## 2023-05-04 PROCEDURE — 90677 PNEUMOCOCCAL CONJUGATE VACCINE 20-VALENT: ICD-10-PCS | Mod: S$GLB,,, | Performed by: STUDENT IN AN ORGANIZED HEALTH CARE EDUCATION/TRAINING PROGRAM

## 2023-05-04 PROCEDURE — 3074F PR MOST RECENT SYSTOLIC BLOOD PRESSURE < 130 MM HG: ICD-10-PCS | Mod: CPTII,S$GLB,, | Performed by: STUDENT IN AN ORGANIZED HEALTH CARE EDUCATION/TRAINING PROGRAM

## 2023-05-04 PROCEDURE — 90677 PCV20 VACCINE IM: CPT | Mod: S$GLB,,, | Performed by: STUDENT IN AN ORGANIZED HEALTH CARE EDUCATION/TRAINING PROGRAM

## 2023-05-04 PROCEDURE — 90471 IMMUNIZATION ADMIN: CPT | Mod: S$GLB,,, | Performed by: STUDENT IN AN ORGANIZED HEALTH CARE EDUCATION/TRAINING PROGRAM

## 2023-05-04 PROCEDURE — 3074F SYST BP LT 130 MM HG: CPT | Mod: CPTII,S$GLB,, | Performed by: STUDENT IN AN ORGANIZED HEALTH CARE EDUCATION/TRAINING PROGRAM

## 2023-05-04 PROCEDURE — 3079F DIAST BP 80-89 MM HG: CPT | Mod: CPTII,S$GLB,, | Performed by: STUDENT IN AN ORGANIZED HEALTH CARE EDUCATION/TRAINING PROGRAM

## 2023-05-04 PROCEDURE — 3008F PR BODY MASS INDEX (BMI) DOCUMENTED: ICD-10-PCS | Mod: CPTII,S$GLB,, | Performed by: STUDENT IN AN ORGANIZED HEALTH CARE EDUCATION/TRAINING PROGRAM

## 2023-05-04 PROCEDURE — 99396 PR PREVENTIVE VISIT,EST,40-64: ICD-10-PCS | Mod: 25,S$GLB,, | Performed by: STUDENT IN AN ORGANIZED HEALTH CARE EDUCATION/TRAINING PROGRAM

## 2023-05-04 PROCEDURE — 99999 PR PBB SHADOW E&M-EST. PATIENT-LVL III: CPT | Mod: PBBFAC,,, | Performed by: STUDENT IN AN ORGANIZED HEALTH CARE EDUCATION/TRAINING PROGRAM

## 2023-05-04 PROCEDURE — 99999 PR PBB SHADOW E&M-EST. PATIENT-LVL III: ICD-10-PCS | Mod: PBBFAC,,, | Performed by: STUDENT IN AN ORGANIZED HEALTH CARE EDUCATION/TRAINING PROGRAM

## 2023-05-04 RX ORDER — LISINOPRIL 10 MG/1
10 TABLET ORAL DAILY
Qty: 90 TABLET | Refills: 3 | Status: SHIPPED | OUTPATIENT
Start: 2023-05-04 | End: 2023-11-01

## 2023-05-04 RX ORDER — METOPROLOL SUCCINATE 25 MG/1
25 TABLET, EXTENDED RELEASE ORAL DAILY
Qty: 90 TABLET | Refills: 3 | Status: SHIPPED | OUTPATIENT
Start: 2023-05-04 | End: 2023-07-18

## 2023-05-04 RX ORDER — ATORVASTATIN CALCIUM 40 MG/1
40 TABLET, FILM COATED ORAL DAILY
Qty: 90 TABLET | Refills: 3 | Status: SHIPPED | OUTPATIENT
Start: 2023-05-04 | End: 2023-08-02

## 2023-05-04 RX ORDER — LEVOTHYROXINE SODIUM 25 UG/1
50 TABLET ORAL DAILY
Qty: 60 TABLET | Refills: 1 | Status: SHIPPED | OUTPATIENT
Start: 2023-05-04 | End: 2023-07-17

## 2023-05-04 RX ORDER — ALBUTEROL SULFATE 90 UG/1
2 AEROSOL, METERED RESPIRATORY (INHALATION) EVERY 6 HOURS PRN
Qty: 18 G | Refills: 0 | Status: SHIPPED | OUTPATIENT
Start: 2023-05-04 | End: 2023-05-04

## 2023-05-04 RX ORDER — OMEPRAZOLE 20 MG/1
20 CAPSULE, DELAYED RELEASE ORAL DAILY
Qty: 90 CAPSULE | Refills: 3 | Status: SHIPPED | OUTPATIENT
Start: 2023-05-04 | End: 2023-08-21

## 2023-05-04 NOTE — TELEPHONE ENCOUNTER
No care due was identified.  Health [facility] Embedded Care Due Messages. Reference number: [id number].   5/04/2023 4:00:39 PM CDT

## 2023-05-04 NOTE — PROGRESS NOTES
Subjective:     Chief Complaint: Annual Exam    HPI  Ms. Nahed Mcginnis is a 47-year-old woman with type 2 diabetes (Ozempic 2 mg weekly; following with endocrinology), hypertension (metoprolol succinate 25, lisinopril 10, and 1/2 of spironolactone 25), and hypothyroidism 50 (substantive diagnosis in question) presenting for annual physical/blood pressure check:    Hypertension:   -very well controlled; effect of spironolactone at 12.5 mg is ambiguous and likely unnecessary continued weight loss    Diabetes:   -following with endocrinology  -currently maintained on Ozempic to weekly  -diabetic eye exam is up-to-date via outside practitioner (provided documentation to be scanned in   -diabetic foot exam and proteinuria screening is up-to-date   -due for completion of pneumococcal series     Hypothyroidism:   -ambiguous etiology (postpartum thyroiditis?)  -as she has been adequately treated on extremely dose of levothyroxine for several years, willing to consider deescalation to 25 with subsequent elimination if no thyroid peroxidase antibodies are identified and TSH rebounds office supplementation     Review of Systems   Constitutional:  Negative for activity change and unexpected weight change.   HENT:  Negative for hearing loss, rhinorrhea and trouble swallowing.    Eyes:  Negative for discharge and visual disturbance.   Respiratory:  Negative for chest tightness and wheezing.    Cardiovascular:  Negative for chest pain and palpitations.   Gastrointestinal:  Negative for blood in stool, constipation, diarrhea and vomiting.   Endocrine: Negative for polydipsia and polyuria.   Genitourinary:  Negative for difficulty urinating, dysuria, hematuria and menstrual problem.   Musculoskeletal:  Negative for arthralgias, joint swelling and neck pain.   Neurological:  Negative for weakness and headaches.   Psychiatric/Behavioral:  Negative for confusion and dysphoric mood.        Objective:      Vitals:    05/04/23 1454    BP: 128/82   Pulse: 72   Resp: 16   Temp: 97.5 °F (36.4 °C)      Physical Exam  Vitals reviewed.   Constitutional:       General: She is not in acute distress.     Appearance: Normal appearance.   HENT:      Head: Normocephalic and atraumatic.      Comments: Facial features are symmetric      Nose: Nose normal. No congestion or rhinorrhea.      Mouth/Throat:      Mouth: Mucous membranes are moist.      Pharynx: Oropharynx is clear. No oropharyngeal exudate or posterior oropharyngeal erythema.   Eyes:      General: No scleral icterus.     Extraocular Movements: Extraocular movements intact.      Conjunctiva/sclera: Conjunctivae normal.   Cardiovascular:      Rate and Rhythm: Normal rate and regular rhythm.      Pulses: Normal pulses.      Heart sounds: Normal heart sounds.   Pulmonary:      Effort: Pulmonary effort is normal. No respiratory distress.      Breath sounds: Normal breath sounds.   Musculoskeletal:         General: No deformity or signs of injury. Normal range of motion.      Cervical back: Normal range of motion.      Comments: Gait normal    Skin:     General: Skin is warm and dry.      Findings: No rash.   Neurological:      General: No focal deficit present.      Mental Status: She is alert and oriented to person, place, and time. Mental status is at baseline.   Psychiatric:         Mood and Affect: Mood normal.         Behavior: Behavior normal.         Thought Content: Thought content normal.     Current Outpatient Medications on File Prior to Visit   Medication Sig Dispense Refill    flash glucose sensor (FREESTYLE JENNY 2 SENSOR) Kit 1 each by Misc.(Non-Drug; Combo Route) route every 14 (fourteen) days. 2 kit 11    semaglutide (OZEMPIC) 2 mg/dose (8 mg/3 mL) PnIj Inject 2 mg into the skin every 7 days. 3 mL 6    terconazole (TERAZOL 3) 0.8 % vaginal cream Place 1 applicator vaginally every evening. 20 g 0    TRUE METRIX GLUCOSE TEST STRIP Strp Pt test her blood sugars 4 times a day 200 each 11     [DISCONTINUED] albuterol (VENTOLIN HFA) 90 mcg/actuation inhaler Inhale 2 puffs into the lungs every 6 (six) hours as needed for Wheezing. Rescue 18 g 0    [DISCONTINUED] levothyroxine (SYNTHROID) 50 MCG tablet Take 1 tablet (50 mcg total) by mouth once daily. 90 tablet 0    [DISCONTINUED] methylPREDNISolone (MEDROL DOSEPACK) 4 mg tablet use as directed 21 each 0    [DISCONTINUED] metoprolol succinate (TOPROL-XL) 25 MG 24 hr tablet Take 1 tablet (25 mg total) by mouth once daily. 90 tablet 0    [DISCONTINUED] omeprazole (PRILOSEC) 20 MG capsule Take 1 capsule (20 mg total) by mouth once daily. 90 capsule 3    ondansetron (ZOFRAN-ODT) 4 MG TbDL Take 1 tablet (4 mg total) by mouth 2 (two) times daily as needed. (Patient not taking: Reported on 3/9/2023) 60 tablet 1    [DISCONTINUED] atorvastatin (LIPITOR) 40 MG tablet Take 1 tablet (40 mg total) by mouth once daily. 90 tablet 3    [DISCONTINUED] lisinopriL 10 MG tablet Take 2 tablets (20 mg total) by mouth once daily. 180 tablet 3    [DISCONTINUED] spironolactone (ALDACTONE) 25 MG tablet Take 0.5 tablets (12.5 mg total) by mouth once daily. 45 tablet 3     No current facility-administered medications on file prior to visit.         Assessment:       1. Physical exam, annual    2. Acute bronchitis, unspecified organism    3. Hypothyroidism, unspecified type    4. Hypertension, unspecified type    5. Type 2 diabetes mellitus without complication, without long-term current use of insulin        Plan:       Physical exam, annual   - all needed annual screening labs have been recently gathered via our colleagues in endocrinology; discussed   - will facilitate scheduling of previously ordered mammogram   - pneumococcal vaccination series completed in office today    Hypothyroidism, unspecified type  -     Thyroid Peroxidase Antibody; Future; Expected date: 05/04/2023  -     T4; Future; Expected date: 05/04/2023   - will attempt to deescalate from 50-25 mcg and  subsequently eliminate entirely if TSH rebounds appropriately; TPO antibody and T4 added to impending TSH via endocrinology; will require another TSH/T4 ordered if 25 mcg is discontinue completely    Hypertension, unspecified type   - presumed control more than adequate on metoprolol and lisinopril (both have secondary benefit) and off of spironolactone (only taking half of lowest prescribe oral dose with no secondary benefit)   - patient return for further blood pressure titration/discussion if pressures persistently greater than 140/90 (does not seem to require a more aggressive/cardiology target given lack of modifiable cardiovascular risk factors)    Type 2 diabetes mellitus without complication, without long-term current use of insulin   - very well controlled; will continue to follow with endocrinology     Other orders  -     lisinopriL 10 MG tablet; Take 1 tablet (10 mg total) by mouth once daily.  Dispense: 90 tablet; Refill: 3  -     metoprolol succinate (TOPROL-XL) 25 MG 24 hr tablet; Take 1 tablet (25 mg total) by mouth once daily.  Dispense: 90 tablet; Refill: 3  -     atorvastatin (LIPITOR) 40 MG tablet; Take 1 tablet (40 mg total) by mouth once daily.  Dispense: 90 tablet; Refill: 3  -     levothyroxine (SYNTHROID) 25 MCG tablet; Take 2 tablets (50 mcg total) by mouth once daily.  Dispense: 60 tablet; Refill: 1  -     omeprazole (PRILOSEC) 20 MG capsule; Take 1 capsule (20 mg total) by mouth once daily.  Dispense: 90 capsule; Refill: 3  -     Pneumococcal Conjugate Vaccine (20 Valent) (IM)

## 2023-05-05 RX ORDER — LEVOTHYROXINE SODIUM 25 UG/1
TABLET ORAL
Qty: 180 TABLET | OUTPATIENT
Start: 2023-05-05

## 2023-05-05 NOTE — TELEPHONE ENCOUNTER
Refill Decision Note   Nahed Mcginnis  is requesting a refill authorization.  Brief Assessment and Rationale for Refill:  Quick Discontinue     Medication Therapy Plan:  new strength change      Comments:     Note composed:5:24 AM 05/05/2023             Appointments     Last Visit   5/4/2023 Fausto Hammonds MD   Next Visit   Visit date not found Fausto Hammonds MD

## 2023-05-10 ENCOUNTER — PATIENT OUTREACH (OUTPATIENT)
Dept: ADMINISTRATIVE | Facility: HOSPITAL | Age: 48
End: 2023-05-10
Payer: COMMERCIAL

## 2023-05-22 RX ORDER — LEVOTHYROXINE SODIUM 25 UG/1
50 TABLET ORAL DAILY
Qty: 60 TABLET | Refills: 1 | Status: CANCELLED | OUTPATIENT
Start: 2023-05-22 | End: 2023-08-20

## 2023-05-22 NOTE — TELEPHONE ENCOUNTER
Care Due:                  Date            Visit Type   Department     Provider  --------------------------------------------------------------------------------                                MYCHART                              ANNUAL                              CHECKUP/PHY  MET INTERNAL  Last Visit: 05-      [facility]       Fausto Hammonds  Next Visit: None Scheduled  None         None Found                                                            Last  Test          Frequency    Reason                     Performed    Due Date  --------------------------------------------------------------------------------    CMP.........  12 months..  atorvastatin.............  08- 08-    [facility] Embedded Care Due Messages. Reference number: [id number].   5/22/2023 6:28:54 AM CDT

## 2023-06-16 ENCOUNTER — TELEPHONE (OUTPATIENT)
Dept: DIABETES | Facility: CLINIC | Age: 48
End: 2023-06-16
Payer: COMMERCIAL

## 2023-06-19 ENCOUNTER — HOSPITAL ENCOUNTER (OUTPATIENT)
Dept: RADIOLOGY | Facility: HOSPITAL | Age: 48
Discharge: HOME | End: 2023-06-19
Attending: STUDENT IN AN ORGANIZED HEALTH CARE EDUCATION/TRAINING PROGRAM
Payer: COMMERCIAL

## 2023-06-19 ENCOUNTER — OFFICE VISIT (OUTPATIENT)
Dept: DIABETES | Facility: CLINIC | Age: 48
End: 2023-06-19
Payer: COMMERCIAL

## 2023-06-19 VITALS
OXYGEN SATURATION: 98 % | BODY MASS INDEX: 27.23 KG/M2 | WEIGHT: 148 LBS | SYSTOLIC BLOOD PRESSURE: 122 MMHG | HEART RATE: 89 BPM | HEIGHT: 62 IN | DIASTOLIC BLOOD PRESSURE: 82 MMHG

## 2023-06-19 DIAGNOSIS — E03.8 HYPOTHYROIDISM DUE TO HASHIMOTO'S THYROIDITIS: ICD-10-CM

## 2023-06-19 DIAGNOSIS — E66.3 OVERWEIGHT (BMI 25.0-29.9): ICD-10-CM

## 2023-06-19 DIAGNOSIS — E11.65 UNCONTROLLED TYPE 2 DIABETES MELLITUS WITH HYPERGLYCEMIA: ICD-10-CM

## 2023-06-19 DIAGNOSIS — E11.9 TYPE 2 DIABETES MELLITUS WITHOUT COMPLICATION, WITHOUT LONG-TERM CURRENT USE OF INSULIN: Primary | ICD-10-CM

## 2023-06-19 DIAGNOSIS — I10 PRIMARY HYPERTENSION: Chronic | ICD-10-CM

## 2023-06-19 DIAGNOSIS — Z12.31 ENCOUNTER FOR SCREENING MAMMOGRAM FOR MALIGNANT NEOPLASM OF BREAST: ICD-10-CM

## 2023-06-19 DIAGNOSIS — E06.3 HYPOTHYROIDISM DUE TO HASHIMOTO'S THYROIDITIS: ICD-10-CM

## 2023-06-19 PROCEDURE — 77067 SCR MAMMO BI INCL CAD: CPT | Mod: 26,,, | Performed by: RADIOLOGY

## 2023-06-19 PROCEDURE — 3008F PR BODY MASS INDEX (BMI) DOCUMENTED: ICD-10-PCS | Mod: CPTII,S$GLB,, | Performed by: NURSE PRACTITIONER

## 2023-06-19 PROCEDURE — 99999 PR PBB SHADOW E&M-EST. PATIENT-LVL IV: CPT | Mod: PBBFAC,,, | Performed by: NURSE PRACTITIONER

## 2023-06-19 PROCEDURE — 1160F PR REVIEW ALL MEDS BY PRESCRIBER/CLIN PHARMACIST DOCUMENTED: ICD-10-PCS | Mod: CPTII,S$GLB,, | Performed by: NURSE PRACTITIONER

## 2023-06-19 PROCEDURE — 95251 PR GLUCOSE MONITOR, 72 HOUR, PHYS INTERP: ICD-10-PCS | Mod: S$GLB,,, | Performed by: NURSE PRACTITIONER

## 2023-06-19 PROCEDURE — 4010F ACE/ARB THERAPY RXD/TAKEN: CPT | Mod: CPTII,S$GLB,, | Performed by: NURSE PRACTITIONER

## 2023-06-19 PROCEDURE — 3008F BODY MASS INDEX DOCD: CPT | Mod: CPTII,S$GLB,, | Performed by: NURSE PRACTITIONER

## 2023-06-19 PROCEDURE — 1159F PR MEDICATION LIST DOCUMENTED IN MEDICAL RECORD: ICD-10-PCS | Mod: CPTII,S$GLB,, | Performed by: NURSE PRACTITIONER

## 2023-06-19 PROCEDURE — 1159F MED LIST DOCD IN RCRD: CPT | Mod: CPTII,S$GLB,, | Performed by: NURSE PRACTITIONER

## 2023-06-19 PROCEDURE — 77063 BREAST TOMOSYNTHESIS BI: CPT | Mod: 26,,, | Performed by: RADIOLOGY

## 2023-06-19 PROCEDURE — 99214 OFFICE O/P EST MOD 30 MIN: CPT | Mod: S$GLB,,, | Performed by: NURSE PRACTITIONER

## 2023-06-19 PROCEDURE — 4010F PR ACE/ARB THEARPY RXD/TAKEN: ICD-10-PCS | Mod: CPTII,S$GLB,, | Performed by: NURSE PRACTITIONER

## 2023-06-19 PROCEDURE — 95251 CONT GLUC MNTR ANALYSIS I&R: CPT | Mod: S$GLB,,, | Performed by: NURSE PRACTITIONER

## 2023-06-19 PROCEDURE — 99999 PR PBB SHADOW E&M-EST. PATIENT-LVL IV: ICD-10-PCS | Mod: PBBFAC,,, | Performed by: NURSE PRACTITIONER

## 2023-06-19 PROCEDURE — 77067 MAMMO DIGITAL SCREENING BILAT WITH TOMO: ICD-10-PCS | Mod: 26,,, | Performed by: RADIOLOGY

## 2023-06-19 PROCEDURE — 3079F DIAST BP 80-89 MM HG: CPT | Mod: CPTII,S$GLB,, | Performed by: NURSE PRACTITIONER

## 2023-06-19 PROCEDURE — 1160F RVW MEDS BY RX/DR IN RCRD: CPT | Mod: CPTII,S$GLB,, | Performed by: NURSE PRACTITIONER

## 2023-06-19 PROCEDURE — 99214 PR OFFICE/OUTPT VISIT, EST, LEVL IV, 30-39 MIN: ICD-10-PCS | Mod: S$GLB,,, | Performed by: NURSE PRACTITIONER

## 2023-06-19 PROCEDURE — 77063 MAMMO DIGITAL SCREENING BILAT WITH TOMO: ICD-10-PCS | Mod: 26,,, | Performed by: RADIOLOGY

## 2023-06-19 PROCEDURE — 3074F PR MOST RECENT SYSTOLIC BLOOD PRESSURE < 130 MM HG: ICD-10-PCS | Mod: CPTII,S$GLB,, | Performed by: NURSE PRACTITIONER

## 2023-06-19 PROCEDURE — 77067 SCR MAMMO BI INCL CAD: CPT | Mod: TC,PO

## 2023-06-19 PROCEDURE — 3044F PR MOST RECENT HEMOGLOBIN A1C LEVEL <7.0%: ICD-10-PCS | Mod: CPTII,S$GLB,, | Performed by: NURSE PRACTITIONER

## 2023-06-19 PROCEDURE — 3044F HG A1C LEVEL LT 7.0%: CPT | Mod: CPTII,S$GLB,, | Performed by: NURSE PRACTITIONER

## 2023-06-19 PROCEDURE — 3079F PR MOST RECENT DIASTOLIC BLOOD PRESSURE 80-89 MM HG: ICD-10-PCS | Mod: CPTII,S$GLB,, | Performed by: NURSE PRACTITIONER

## 2023-06-19 PROCEDURE — 3074F SYST BP LT 130 MM HG: CPT | Mod: CPTII,S$GLB,, | Performed by: NURSE PRACTITIONER

## 2023-06-19 RX ORDER — SEMAGLUTIDE 1.34 MG/ML
1 INJECTION, SOLUTION SUBCUTANEOUS
Qty: 3 ML | Refills: 11 | Status: SHIPPED | OUTPATIENT
Start: 2023-06-19 | End: 2024-06-18

## 2023-06-19 RX ORDER — FLASH GLUCOSE SENSOR
1 KIT MISCELLANEOUS
Qty: 2 KIT | Refills: 11 | Status: SHIPPED | OUTPATIENT
Start: 2023-06-19

## 2023-06-19 NOTE — PROGRESS NOTES
CC:   Chief Complaint   Patient presents with    Diabetes Mellitus       HPI: Nahed Mcginnis is a 47 y.o. female presents for a follow up visit today for the management of T2DM.    She was diagnosed with Type 2 diabetes around 2019 on routine labs   Prediabetes over 10 years prior to T2DM dx     Family hx of diabetes: brothers, father   Hospitalized for diabetes: denies   Insulin therapy: denies     No personal or FH of thyroid cancer or personal of pancreatic cancer or pancreatitis.     Our last visit was in December 2022  At that visit we increased her ozempic to 2 mg weekly   Total 62# weight loss since starting ozempic   Occ constipation.   No other GI upset or symptoms.   Still wearing freestyle Manoj personal CGM  Having some nocturnal hypoglycemia  See attached download    She reports compliance with her levothyroxine.  She reports her PCP was trying to titrate her off of it however she recently had a positive TPO-- is to resume 50 mcg daily             DIABETES COMPLICATIONS: none      Diabetes Management Status    ASA:  No    Statin: Taking-- Lipitor 40 mg   ACE/ARB:  Taking lisinopril 10 mg     Screening or Prevention Patient's value Goal Complete/Controlled?   HgA1C Testing and Control   Lab Results   Component Value Date    HGBA1C 5.5 06/12/2023      Annually/Less than 8% Yes     Lipid profile : 12/21/2022 Annually Yes     LDL control Lab Results   Component Value Date    LDLCALC 70.2 12/21/2022    Annually/Less than 100 mg/dl  Yes     Nephropathy screening Lab Results   Component Value Date    LABMICR 28.0 12/21/2022     Lab Results   Component Value Date    PROTEINUA Negative 02/04/2021    Annually Yes     Blood pressure BP Readings from Last 1 Encounters:   06/19/23 122/82    Less than 140/90 Yes     Dilated retinal exam : 03/22/2023 Annually Yes     Foot exam   : 06/19/2023 Annually Yes         CURRENT A1C:    Hemoglobin A1C   Date Value Ref Range Status   06/12/2023 5.5 4.0 - 5.6 % Final      Comment:     ADA Screening Guidelines:  5.7-6.4%  Consistent with prediabetes  >or=6.5%  Consistent with diabetes    High levels of fetal hemoglobin interfere with the HbA1C  assay. Heterozygous hemoglobin variants (HbS, HgC, etc)do  not significantly interfere with this assay.   However, presence of multiple variants may affect accuracy.     2022 5.9 (H) 4.0 - 5.6 % Final     Comment:     ADA Screening Guidelines:  5.7-6.4%  Consistent with prediabetes  >or=6.5%  Consistent with diabetes    High levels of fetal hemoglobin interfere with the HbA1C  assay. Heterozygous hemoglobin variants (HbS, HgC, etc)do  not significantly interfere with this assay.   However, presence of multiple variants may affect accuracy.     2022 6.7 (H) 4.0 - 5.6 % Final     Comment:     ADA Screening Guidelines:  5.7-6.4%  Consistent with prediabetes  >or=6.5%  Consistent with diabetes    High levels of fetal hemoglobin interfere with the HbA1C  assay. Heterozygous hemoglobin variants (HbS, HgC, etc)do  not significantly interfere with this assay.   However, presence of multiple variants may affect accuracy.         GOAL A1C: 6.5% without hypoglycemia       DM MEDICATIONS USED IN THE PAST: Ozempic   Metformin - gi side effects.   jardiance - yeast infections. Stopped in  -  DPP4 - Januvia  SGLT2 - jardiance - just restarted recently - however has new yeast infection.   Glimepiride 4mg with breakfast.  Ozempic       CURRENT DIABETES MEDICATIONS: Ozempic 2 mg weekly on Saturday AM   Insulin: N/A    Missed doses: denies         BLOOD GLUCOSE MONITORING:    Sensor type: Freestyle kalani 2   Average BG readin  Estimated A1c is 5.5%   Time in range: 94%  6% low   Very low- 0%   0% high   0% very high   Site change:  q14 days.      Sensor was downloaded in clinic today and reviewed with patient.   Please see attached document for download.        HYPOGLYCEMIA:  6% low   0% very low   Did not confirm with a fingerstick          MEALS: eating 2 meals per day   BF: coffee and eggs   Lunch: 1/2 sandwich or grilled chicken or left overs   Dinner: skip usually -- something very light   Snack: rarely -- fruit -- usually doesn't snack   Drinks: water        CURRENT EXERCISE:  gym 2 times per week at least       Review of Systems  Review of Systems   Constitutional:  Negative for appetite change, fatigue and unexpected weight change.   HENT:  Negative for trouble swallowing.    Eyes:  Negative for visual disturbance.   Respiratory:  Negative for shortness of breath.    Cardiovascular:  Negative for chest pain.   Gastrointestinal:  Positive for constipation. Negative for abdominal distention, abdominal pain, nausea and vomiting.   Endocrine: Negative for polydipsia, polyphagia and polyuria.   Genitourinary:         No Nocturia    Skin:  Negative for wound.   Neurological:  Negative for numbness.       Physical Exam   Physical Exam  Vitals and nursing note reviewed.   Constitutional:       General: She is not in acute distress.     Appearance: Normal appearance. She is well-developed. She is not ill-appearing.      Comments: Overweight female    HENT:      Head: Normocephalic and atraumatic.      Right Ear: External ear normal.      Left Ear: External ear normal.      Nose: Nose normal.   Neck:      Thyroid: No thyromegaly.      Trachea: No tracheal deviation.   Cardiovascular:      Rate and Rhythm: Normal rate and regular rhythm.      Heart sounds: No murmur heard.  Pulmonary:      Effort: Pulmonary effort is normal. No respiratory distress.      Breath sounds: Normal breath sounds.   Abdominal:      Palpations: Abdomen is soft.      Tenderness: There is no abdominal tenderness.      Hernia: No hernia is present.   Musculoskeletal:      Cervical back: Normal range of motion and neck supple.   Skin:     General: Skin is warm and dry.      Capillary Refill: Capillary refill takes less than 2 seconds.      Findings: No rash.      Comments:  Injection sites are normal appearing. No lipo hypertropthy or atrophy     Neurological:      Mental Status: She is alert and oriented to person, place, and time.      Cranial Nerves: No cranial nerve deficit.   Psychiatric:         Mood and Affect: Mood normal.         Behavior: Behavior normal.         Thought Content: Thought content normal.         Judgment: Judgment normal.       FOOT EXAMINATION:wearing sandals     Protective Sensation (w/ 10 gram monofilament):  Right: Intact  Left: Intact    Visual Inspection:  Normal -  Bilateral and Nails Intact - without Evidence of Foot Deformity- Bilateral    Pedal Pulses:   Right: Present  Left: Present    Posterior Tibialis Pulses:   Right:Present  Left: Present        Lab Results   Component Value Date    TSH 3.629 06/12/2023           Type 2 diabetes mellitus without complication, without long-term current use of insulin  Controlled   Blood sugar readings are well controlled on her freestyle Manoj download  May be having nocturnal hypoglycemia- but she is asymptomatic   We discussed that if she is laying on the sensor at night, she may be having false lows       -- Medication Changes:   Cut back on Ozempic to 1 mg weekly    Goal is to wean off the Ozempic -- but she would like to continue to lose weight       -- Reviewed goals of therapy are to get the best control we can without hypoglycemia.  -- Reviewed patient's current insulin regimen. Clarified proper insulin dose and timing in relation to meals, etc. Insulin injection sites and proper rotation instructed.    -- Advised frequent self blood glucose monitoring.  Patient encouraged to document glucose results and bring them to every clinic visit.  Continue freestyle Manoj 2.   -- Hypoglycemia precautions discussed. Instructed on precautions before driving.    -- Call for Bg repeatedly < 70 or > 180.   -- Close adherence to lifestyle changes recommended.   -- Periodic follow ups for eye evaluations, foot care and  dental care suggested.          HTN (hypertension)  BP goal is < 140/90.   Tolerating ACEi  Controlled   Blood pressure goals discussed with patient        Hypothyroidism due to Hashimoto's thyroiditis  + TPO   She plans to go back on LT4 50 mcg daily    reinforced take by itself on empty stomach, first thing in the morning and wait at least 30-60 minutes to eat, drink, or take other medications.            Overweight (BMI 25.0-29.9)  Body mass index is 27.07 kg/m².  Increases insulin resistance.   Discussed DM diet and exercise.           Follow up in about 1 year (around 6/19/2024).  See PCP in 6 months   Follow up with me in 1 year       No orders of the defined types were placed in this encounter.      Recommendations were discussed with the patient in detail  The patient verbalized understanding and agrees with the plan outlined as above.     This note was partly generated with [employer] voice recognition software. I apologize for any possible typographical errors.

## 2023-06-19 NOTE — ASSESSMENT & PLAN NOTE
Body mass index is 27.07 kg/m².  Increases insulin resistance.   Discussed DM diet and exercise.

## 2023-06-19 NOTE — ASSESSMENT & PLAN NOTE
+ TPO   She plans to go back on LT4 50 mcg daily    reinforced take by itself on empty stomach, first thing in the morning and wait at least 30-60 minutes to eat, drink, or take other medications.

## 2023-06-19 NOTE — ASSESSMENT & PLAN NOTE
Controlled   Blood sugar readings are well controlled on her freestyle Manoj download  May be having nocturnal hypoglycemia- but she is asymptomatic   We discussed that if she is laying on the sensor at night, she may be having false lows       -- Medication Changes:   Cut back on Ozempic to 1 mg weekly    Goal is to wean off the Ozempic -- but she would like to continue to lose weight       -- Reviewed goals of therapy are to get the best control we can without hypoglycemia.  -- Reviewed patient's current insulin regimen. Clarified proper insulin dose and timing in relation to meals, etc. Insulin injection sites and proper rotation instructed.    -- Advised frequent self blood glucose monitoring.  Patient encouraged to document glucose results and bring them to every clinic visit.  Continue freestyle Manoj 2.   -- Hypoglycemia precautions discussed. Instructed on precautions before driving.    -- Call for Bg repeatedly < 70 or > 180.   -- Close adherence to lifestyle changes recommended.   -- Periodic follow ups for eye evaluations, foot care and dental care suggested.

## 2023-07-12 ENCOUNTER — OFFICE VISIT (OUTPATIENT)
Dept: URGENT CARE | Facility: CLINIC | Age: 48
End: 2023-07-12
Payer: COMMERCIAL

## 2023-07-12 VITALS
BODY MASS INDEX: 25.76 KG/M2 | HEIGHT: 62 IN | RESPIRATION RATE: 18 BRPM | OXYGEN SATURATION: 98 % | TEMPERATURE: 100 F | SYSTOLIC BLOOD PRESSURE: 122 MMHG | DIASTOLIC BLOOD PRESSURE: 86 MMHG | WEIGHT: 140 LBS | HEART RATE: 96 BPM

## 2023-07-12 DIAGNOSIS — J32.9 VIRAL SINUSITIS: Primary | ICD-10-CM

## 2023-07-12 DIAGNOSIS — J32.9 RECURRENT SINUSITIS: ICD-10-CM

## 2023-07-12 DIAGNOSIS — B97.89 VIRAL SINUSITIS: Primary | ICD-10-CM

## 2023-07-12 PROCEDURE — 99203 OFFICE O/P NEW LOW 30 MIN: CPT | Mod: S$GLB,,,

## 2023-07-12 PROCEDURE — 99203 PR OFFICE/OUTPT VISIT, NEW, LEVL III, 30-44 MIN: ICD-10-PCS | Mod: S$GLB,,,

## 2023-07-12 RX ORDER — PROMETHAZINE HYDROCHLORIDE AND DEXTROMETHORPHAN HYDROBROMIDE 6.25; 15 MG/5ML; MG/5ML
5 SYRUP ORAL NIGHTLY PRN
Qty: 118 ML | Refills: 0 | Status: SHIPPED | OUTPATIENT
Start: 2023-07-12 | End: 2024-01-29

## 2023-07-12 RX ORDER — FLUTICASONE PROPIONATE 50 MCG
1 SPRAY, SUSPENSION (ML) NASAL DAILY
Qty: 16 G | Refills: 0 | Status: SHIPPED | OUTPATIENT
Start: 2023-07-12 | End: 2024-01-29

## 2023-07-12 RX ORDER — PREDNISONE 20 MG/1
40 TABLET ORAL DAILY
Qty: 8 TABLET | Refills: 0 | Status: SHIPPED | OUTPATIENT
Start: 2023-07-12 | End: 2023-07-16

## 2023-07-12 NOTE — PATIENT INSTRUCTIONS
Your symptoms are likely from an allergen at this time.     Take prednisone (steroid) as directed x 4 days. Take in the morning so it does not keep you up at night. Take with food and water. This medication can increase your BP and sugar level. Please monitor and eat a well balanced diet. Avoid high salt content foods and eat more foods with potassium, magnesium and calcium.     Take promethazine/DM cough syrup at night for cough. Promethazine is an oral anti-histamine and will help with sinus relief. DM (dextromethorphan) is a decongestant. This medication will make you drowsy. Make sure you do not drive a vehicle, drink alcohol, operate machinery or take other sedating medications while taking.     Try a decongestant and corticosteroid nasal spray like flonase for the next few days for sinus relief. Initial: 2 sprays in each nostril once daily for 1 week. Reduce to 1 spray in each nostril once per day. Stop taking if you develop a nose bleed. Nasal saline spray can be used together with flonase to help moisten nostrils. An antihistamine like zyrtec, claritin, allegra can also be helpful for sinus relief and will help dry nasal passages.     Regular (Guaifenesin) Mucinex 1200 mg twice per day for 10 days can help thin secretions for better clearance. Drink plenty of fluids with this.     Honey is a natural cough suppressant.     Warm tea/warm liquids will help soothe the back of your throat. Warm water salt gurgles can also be helpful. A dry throat will cause pain. Make sure to stay hydrated. Water and pedilyte are the best to drink. Neti pot irrigation, humidifier in your room, avoiding fans, warm compresses to face, eating/drinking hot soups, hot shower before bedtime can help.     The recommended daily fluid intake for men is 3.7 liters (seven 16 oz bottles).  The recommended daily fluid intake for women is 2.7 liters (five 16 oz bottles).      Alternate Tylenol and ibuprofen every 4 hours as needed for fever  and body aches.  Please take NSAIDs with a full glass of water and food to avoid GI upset.      Please only use over the counter cough and cold medications for 3-5 days at a time to avoid rebound symptoms.     Getting plenty of rest can aid in a faster recovery of illnesses.     Avoid OTC cough and cold medications that can raise your BP such as dextromethorphan, pseudoephedrine, phenylephrine, naphazoline and oxymetazoline. Try DASH diet and buy a blood pressure cuff for home monitoring. Check blood pressure at least 2 times per day and create a log. Avoid eating foods that are high in salt. Eat more foods with potassium, magnesium and calcium which will help dilate your vessels and decrease your BP.     Follow up with ENT for recurrent sinusitis. A referral was placed for you, call [phone] to schedule appointment.     Report to the ER if you have chest pain, shortness of breath, palpitations.

## 2023-07-12 NOTE — PROGRESS NOTES
"Subjective:      Patient ID: Nahed Mcginnis is a 47 y.o. female.    Vitals:  height is 5' 2" (1.575 m) and weight is 63.5 kg (140 lb). Her oral temperature is 99.5 °F (37.5 °C). Her blood pressure is 122/86 and her pulse is 96. Her respiration is 18 and oxygen saturation is 98%.     Chief Complaint: Cough and Nasal Congestion    This is a 47 y.o. female who presents today with a chief complaint of cough x 6 days, pt state she has been taking mucinex and zyrterc. Pt does not want a covid test done    Cough  The current episode started in the past 7 days. The problem has been gradually worsening. The problem occurs constantly. The cough is Productive of sputum. Associated symptoms include headaches, nasal congestion and postnasal drip. Pertinent negatives include no chest pain, chills, ear congestion, ear pain, fever, heartburn, hemoptysis, myalgias, rash, rhinorrhea, sore throat, shortness of breath, sweats, weight loss or wheezing. The symptoms are aggravated by lying down and cold air. She has tried nothing (mucinex and zyrtec) for the symptoms. The treatment provided no relief. There is no history of asthma, bronchiectasis, bronchitis, COPD, emphysema, environmental allergies or pneumonia.   Constitution: Negative for chills, fatigue and fever.   HENT:  Positive for congestion and postnasal drip. Negative for ear pain and sore throat.    Cardiovascular:  Negative for chest pain.   Respiratory:  Positive for cough. Negative for bloody sputum, shortness of breath and wheezing.    Gastrointestinal:  Negative for abdominal pain, nausea, vomiting, diarrhea and heartburn.   Musculoskeletal:  Negative for muscle ache.   Skin:  Negative for rash.   Allergic/Immunologic: Negative for environmental allergies.   Neurological:  Positive for headaches.    Objective:     Physical Exam   Constitutional: She is oriented to person, place, and time. She appears well-developed. She is cooperative.  Non-toxic appearance. She does " not appear ill. No distress.   HENT:   Head: Normocephalic and atraumatic.   Ears:   Right Ear: Hearing, tympanic membrane, external ear and ear canal normal.   Left Ear: Hearing, tympanic membrane, external ear and ear canal normal.   Nose: Congestion present. No mucosal edema, rhinorrhea or nasal deformity. No epistaxis. Right sinus exhibits no maxillary sinus tenderness and no frontal sinus tenderness. Left sinus exhibits no maxillary sinus tenderness and no frontal sinus tenderness.   Mouth/Throat: Uvula is midline and mucous membranes are normal. No trismus in the jaw. Normal dentition. No uvula swelling. Posterior oropharyngeal erythema present. No oropharyngeal exudate or posterior oropharyngeal edema.   Eyes: Conjunctivae and lids are normal. Pupils are equal, round, and reactive to light. No scleral icterus.   Neck: Trachea normal and phonation normal. Neck supple. No edema present. No erythema present. No neck rigidity present.   Cardiovascular: Normal rate, regular rhythm, normal heart sounds and normal pulses.   Pulmonary/Chest: Effort normal and breath sounds normal. No stridor. No respiratory distress. She has no decreased breath sounds. She has no wheezes. She has no rhonchi. She has no rales.   Abdominal: Normal appearance.   Musculoskeletal: Normal range of motion.         General: No deformity. Normal range of motion.   Lymphadenopathy:     She has cervical adenopathy.   Neurological: She is alert and oriented to person, place, and time. She exhibits normal muscle tone. Coordination normal.   Skin: Skin is warm, dry, intact, not diaphoretic and not pale.   Psychiatric: Her speech is normal and behavior is normal. Judgment and thought content normal.   Nursing note and vitals reviewed.    Assessment:     1. Viral sinusitis    2. Recurrent sinusitis        Plan:       Viral sinusitis  -     predniSONE (DELTASONE) 20 MG tablet; Take 2 tablets (40 mg total) by mouth once daily. for 4 days  Dispense: 8  tablet; Refill: 0  -     promethazine-dextromethorphan (PROMETHAZINE-DM) 6.25-15 mg/5 mL Syrp; Take 5 mLs by mouth nightly as needed (cough).  Dispense: 118 mL; Refill: 0  -     fluticasone propionate (FLONASE) 50 mcg/actuation nasal spray; 1 spray (50 mcg total) by Each Nostril route once daily.  Dispense: 16 g; Refill: 0    Recurrent sinusitis  -     Ambulatory referral/consult to ENT            Discussed results/diagnosis/plan with patient in clinic. Strict precautions given to patient to monitor for worsening signs and symptoms. Advised to follow up with PCP or specialist.  Explained side effects of medications prescribed with patient and informed him/her to discontinue use if he/she has any side effects and to inform UC or PCP if this occurs. All questions answered. Strict ED verses clinic return precautions stressed and given in depth. Advised if symptoms worsens of fail to improve he/she should go to the Emergency Room. Discharge and follow-up instructions given verbally/printed with the patient who expressed understanding and willingness to comply with my recommendations. Patient voiced understanding and in agreement with current treatment plan. Patient exits the exam room in no acute distress. Conversant and engaged during discharge discussion, verbalized understanding.

## 2023-07-16 RX ORDER — METOPROLOL SUCCINATE 25 MG/1
25 TABLET, EXTENDED RELEASE ORAL DAILY
Qty: 90 TABLET | Refills: 3 | Status: CANCELLED | OUTPATIENT
Start: 2023-07-16

## 2023-07-16 NOTE — TELEPHONE ENCOUNTER
No care due was identified.  [facility] Embedded Care Due Messages. Reference number: [id number].   7/16/2023 7:08:13 AM CDT

## 2023-07-16 NOTE — TELEPHONE ENCOUNTER
No care due was identified.  [facility] Embedded Care Due Messages. Reference number: [id number].   7/16/2023 7:01:36 AM CDT

## 2023-07-17 RX ORDER — LEVOTHYROXINE SODIUM 50 UG/1
50 TABLET ORAL
Qty: 90 TABLET | Refills: 3 | Status: SHIPPED | OUTPATIENT
Start: 2023-07-17

## 2023-07-17 NOTE — TELEPHONE ENCOUNTER
Refill Routing Note   Medication(s) are not appropriate for processing by [facility] for the following reason(s):      New or recently adjusted medication: changed from one 50mcg tablet to two 25mcg tablets daily, pt reported taking only 25mcg daily    [facility] action(s):  Defer Care Due:  None identified          Appointments  past 12m or future 3m with PCP    Date Provider   Last Visit   5/4/2023 Fausto Hammonds MD   Next Visit   7/16/2023 Fausto Hammonds MD   ED visits in past 90 days: 0        Note composed:11:15 AM 07/17/2023

## 2023-07-18 RX ORDER — METOPROLOL SUCCINATE 25 MG/1
TABLET, EXTENDED RELEASE ORAL
Qty: 90 TABLET | Refills: 3 | Status: SHIPPED | OUTPATIENT
Start: 2023-07-18

## 2023-07-18 NOTE — TELEPHONE ENCOUNTER
Refill Decision Note   Nahed Rahel  is requesting a refill authorization.  Brief Assessment and Rationale for Refill:  Approve     Medication Therapy Plan:       Medication Reconciliation Completed: No   Comments:     No Care Gaps recommended.     Note composed:11:28 AM 07/18/2023

## 2023-07-18 NOTE — TELEPHONE ENCOUNTER
No care due was identified.  [facility] Embedded Care Due Messages. Reference number: [id number].   7/17/2023 8:40:08 PM CDT

## 2023-08-20 NOTE — TELEPHONE ENCOUNTER
No care due was identified.  [facility] Embedded Care Due Messages. Reference number: [id number].   8/20/2023 7:44:28 AM CDT

## 2023-08-21 RX ORDER — OMEPRAZOLE 20 MG/1
20 CAPSULE, DELAYED RELEASE ORAL
Qty: 90 CAPSULE | Refills: 2 | Status: SHIPPED | OUTPATIENT
Start: 2023-08-21 | End: 2024-02-09

## 2023-08-21 NOTE — TELEPHONE ENCOUNTER
Refill Decision Note      Refill Decision Note   Nahed Mcginnis  is requesting a refill authorization.  Brief Assessment and Rationale for Refill:  Approve     Medication Therapy Plan:         Comments:     Note composed:4:12 AM 08/21/2023             Appointments     Last Visit   5/4/2023 Fausto Hammonds MD   Next Visit   Visit date not found Fausto Hammonds MD

## 2023-10-18 ENCOUNTER — PATIENT MESSAGE (OUTPATIENT)
Dept: OBSTETRICS AND GYNECOLOGY | Facility: CLINIC | Age: 48
End: 2023-10-18
Payer: COMMERCIAL

## 2023-10-19 ENCOUNTER — OFFICE VISIT (OUTPATIENT)
Dept: OBSTETRICS AND GYNECOLOGY | Facility: CLINIC | Age: 48
End: 2023-10-19
Attending: OBSTETRICS & GYNECOLOGY
Payer: COMMERCIAL

## 2023-10-19 DIAGNOSIS — Z30.011 ENCOUNTER FOR INITIAL PRESCRIPTION OF CONTRACEPTIVE PILLS: Primary | ICD-10-CM

## 2023-10-19 DIAGNOSIS — E11.9 TYPE 2 DIABETES MELLITUS WITHOUT COMPLICATION, WITHOUT LONG-TERM CURRENT USE OF INSULIN: ICD-10-CM

## 2023-10-19 DIAGNOSIS — I10 PRIMARY HYPERTENSION: Chronic | ICD-10-CM

## 2023-10-19 PROCEDURE — 99213 OFFICE O/P EST LOW 20 MIN: CPT | Mod: 95,,, | Performed by: OBSTETRICS & GYNECOLOGY

## 2023-10-19 PROCEDURE — 1160F RVW MEDS BY RX/DR IN RCRD: CPT | Mod: CPTII,95,, | Performed by: OBSTETRICS & GYNECOLOGY

## 2023-10-19 PROCEDURE — 4010F PR ACE/ARB THEARPY RXD/TAKEN: ICD-10-PCS | Mod: CPTII,95,, | Performed by: OBSTETRICS & GYNECOLOGY

## 2023-10-19 PROCEDURE — 1159F PR MEDICATION LIST DOCUMENTED IN MEDICAL RECORD: ICD-10-PCS | Mod: CPTII,95,, | Performed by: OBSTETRICS & GYNECOLOGY

## 2023-10-19 PROCEDURE — 3044F HG A1C LEVEL LT 7.0%: CPT | Mod: CPTII,95,, | Performed by: OBSTETRICS & GYNECOLOGY

## 2023-10-19 PROCEDURE — 1160F PR REVIEW ALL MEDS BY PRESCRIBER/CLIN PHARMACIST DOCUMENTED: ICD-10-PCS | Mod: CPTII,95,, | Performed by: OBSTETRICS & GYNECOLOGY

## 2023-10-19 PROCEDURE — 4010F ACE/ARB THERAPY RXD/TAKEN: CPT | Mod: CPTII,95,, | Performed by: OBSTETRICS & GYNECOLOGY

## 2023-10-19 PROCEDURE — 1159F MED LIST DOCD IN RCRD: CPT | Mod: CPTII,95,, | Performed by: OBSTETRICS & GYNECOLOGY

## 2023-10-19 PROCEDURE — 3044F PR MOST RECENT HEMOGLOBIN A1C LEVEL <7.0%: ICD-10-PCS | Mod: CPTII,95,, | Performed by: OBSTETRICS & GYNECOLOGY

## 2023-10-19 PROCEDURE — 99213 PR OFFICE/OUTPT VISIT, EST, LEVL III, 20-29 MIN: ICD-10-PCS | Mod: 95,,, | Performed by: OBSTETRICS & GYNECOLOGY

## 2023-10-19 RX ORDER — DROSPIRENONE 4 MG/1
4 TABLET, FILM COATED ORAL DAILY
Qty: 28 TABLET | Refills: 11 | Status: SHIPPED | OUTPATIENT
Start: 2023-10-19 | End: 2024-01-29 | Stop reason: SDUPTHER

## 2023-10-19 NOTE — PROGRESS NOTES
TELEMEDICINE VISIT:    The patient location is: Home  The chief complaint leading to consultation is: Contraception  Visit type: audiovisual  Each patient to whom he or she provides medical services by telemedicine is:  (1) informed of the relationship between the physician and patient and the respective role of any other health care provider with respect to management of the patient; and (2) notified that he or she may decline to receive medical services by telemedicine and may withdraw from such care at any time.    CC: Contraception      HPI:  Nahed Mcginnis is a 48 y.o. female patient  who requests a telemedicine visit to discuss contraceptive options.  Menses occur every 30-35 days, lasting 5-7 days in duration, without intermenstrual bleeding.  Previously, she describes being on a combination OCP (?OTC) without any issues which she discontinued about 2-3 years ago.  Contraception is now an issue and she would like to return to OCPs.  She has a history of hypertension with blood pressure well-controlled on Toprol and Lisinopril.  Most recent Epic pressure: 122/86.  She also has diabetes with sugars well controlled with Ozempic.    Patient's last menstrual period was 10/07/2023.    23 Pap: negative, HPV: negative    23 Mammogram: Negative, TC 5.39% discussed    Past Medical History:   Diagnosis Date    Diabetes mellitus, type 2     DM (diabetes mellitus)     Hypertension     Hypothyroidism        Past Surgical History:   Procedure Laterality Date     SECTION      DILATION AND CURETTAGE OF UTERUS      TONSILLECTOMY           Diagnosis:  1. Encounter for initial prescription of contraceptive pills    2. Primary hypertension    3. Type 2 diabetes mellitus without complication, without long-term current use of insulin          PLAN:    Orders Placed This Encounter    drospirenone, contraceptive, (SLYND) 4 mg (28) Tab       Patient was counseled today on contraceptive options: barrier,  hormonal (OCPs, Evra patches, NuvaRing, Depo-Provera, Nexplanon), IUDs (Sol, Kyleena, Mirena, ParaGard), etc.  She would like to return to an oral contraceptive pill.  Although her blood pressure and sugars are both well controlled on medication, with her history of hypertension and diabetes, we discussed using a progesterone only pill instead of a combination pill.  We reviewed Slynd and: risks, benefits, correct usage.  She will let us know her progress in several months.      Follow-up for annual exam 12/2023    I spent a total of 23 minutes on the day of the visit.This includes face to face time and non-face to face time preparing to see the patient (eg, review of tests), Obtaining and/or reviewing separately obtained history, Documenting clinical information in the electronic or other health record, Independently interpreting resultsand communicating results to the patient/family/caregiver, or Care coordination.    This note was created with voice recognition software.  Grammatical, syntax and spelling errors may be inevitable.

## 2023-11-01 RX ORDER — LISINOPRIL 10 MG/1
TABLET ORAL
Qty: 180 TABLET | Refills: 1 | Status: SHIPPED | OUTPATIENT
Start: 2023-11-01 | End: 2024-02-01

## 2023-11-01 NOTE — TELEPHONE ENCOUNTER
Refill Routing Note   Medication(s) are not appropriate for processing by [facility] for the following reason(s):      New or recently adjusted medication  Clarification of medication (Rx) details  Due for refill >6 months ago    [facility] action(s):  Defer Care Due:  Labs due     Medication Therapy Plan: Last prescribed was once daily in may; per epic adherence patient filled in march for 2 times daily      Appointments  past 12m or future 3m with PCP    Date Provider   Last Visit   5/4/2023 Fausto Hammonds MD   Next Visit   Visit date not found Fausto Hammonds MD   ED visits in past 90 days: 0        Note composed:6:26 AM 11/01/2023

## 2023-11-01 NOTE — TELEPHONE ENCOUNTER
Care Due:                  Date            Visit Type   Department     Provider  --------------------------------------------------------------------------------                                MYCHART                              ANNUAL                              CHECKUP/PHY  MET INTERNAL  Last Visit: 05-      [facility]       Fausto Hammonds  Next Visit: None Scheduled  None         None Found                                                            Last  Test          Frequency    Reason                     Performed    Due Date  --------------------------------------------------------------------------------    Lipid Panel.  12 months..  atorvastatin.............  12- 12-    [facility] Embedded Care Due Messages. Reference number: [id number].   11/01/2023 6:01:08 AM CDT

## 2023-11-08 ENCOUNTER — PATIENT MESSAGE (OUTPATIENT)
Dept: OBSTETRICS AND GYNECOLOGY | Facility: CLINIC | Age: 48
End: 2023-11-08
Payer: COMMERCIAL

## 2023-11-12 ENCOUNTER — PATIENT MESSAGE (OUTPATIENT)
Dept: DIABETES | Facility: CLINIC | Age: 48
End: 2023-11-12
Payer: COMMERCIAL

## 2023-11-20 ENCOUNTER — TELEPHONE (OUTPATIENT)
Dept: PHARMACY | Facility: CLINIC | Age: 48
End: 2023-11-20
Payer: COMMERCIAL

## 2024-01-10 ENCOUNTER — PATIENT MESSAGE (OUTPATIENT)
Dept: DIABETES | Facility: CLINIC | Age: 49
End: 2024-01-10
Payer: COMMERCIAL

## 2024-01-28 NOTE — PROGRESS NOTES
"Nahed Mcginnis is a 48 y.o. female  who presents for annual exam.  She was seen for a virtual visit 10/10/23 to discus contraceptive options.  At that time, she elected to begin Slynd and has been doing well with this progesterone only pill.  She reports monthly menses lasting 3-4 days in duration.  Reports BP and sugars are well controlled.  No GYN complaints.  Patient's last menstrual period was 2024.    Blood pressure 112/76, height 5' 2" (1.575 m), weight 63.6 kg (140 lb 3.4 oz), last menstrual period 2024.    22 Pap: Negative, HPV: Negative    23 Mammogram: Negative, TC 5.39%    23 HgbA1c:  5.5    Past Medical History:   Diagnosis Date    Diabetes mellitus, type 2     DM (diabetes mellitus)     Hypertension     Hypothyroidism        Past Surgical History:   Procedure Laterality Date     SECTION      DILATION AND CURETTAGE OF UTERUS      TONSILLECTOMY         OB History          1    Para   1    Term   1            AB        Living   1         SAB        IAB        Ectopic        Multiple        Live Births   1                 ROS:  GENERAL: Feeling well overall.   SKIN: Denies rash or lesions.   HEAD: Denies head injury or headache.   NODES: Denies enlarged lymph nodes.   CHEST: Denies chest pain or shortness of breath.   CARDIOVASCULAR: Denies palpitations or left sided chest pain.   ABDOMEN: No abdominal pain, nausea, vomiting or rectal bleeding.   URINARY: No dysuria or hematuria.  REPRODUCTIVE: See HPI.   BREASTS: Denies pain, lumps, or nipple discharge.   HEMATOLOGIC: No easy bruisability or excessive bleeding.   MUSCULOSKELETAL: Denies joint pain or swelling.   NEUROLOGIC: Denies syncope or weakness.   PSYCHIATRIC: Denies depression.    PE:   (chaperone present during entire exam)  APPEARANCE: Well nourished, well developed, in no acute distress.  BREASTS: Symmetrical, no skin changes or visible lesions. No palpable masses, nipple discharge or " adenopathy bilaterally.  ABDOMEN: Soft. No tenderness or masses.   VULVA: No lesions. Normal female genitalia.  URETHRAL MEATUS: Normal size and location, no lesions, no prolapse.  URETHRA: No masses, tenderness, prolapse or scarring.  VAGINA: Moist and well rugated, no abnormal discharge, no significant cystocele or rectocele.  CERVIX: No lesions and discharge.   UTERUS: Normal size, regular shape, mobile, non-tender, bladder base nontender.  ADNEXA: No masses, tenderness or CDS nodularity.  ANUS PERINEUM: Normal.      Diagnosis:  1. Women's annual routine gynecological examination    2. Encounter for surveillance of contraceptive pills    3. Visit for screening mammogram    4. Primary hypertension    5. Type 2 diabetes mellitus without complication, without long-term current use of insulin          PLAN:    Orders Placed This Encounter    Mammo Digital Screening Bilat w/ Doming[facility]    drospirenone, contraceptive, (SLYND) 4 mg (28) Tab       Patient was counseled today on the need for annual gyn exams.  We reviewed her usage of Slynd with which she is doing well and desires to continue.    Follow-up in 1 year.    This note was created with voice recognition software.  Grammatical, syntax and spelling errors may be inevitable.

## 2024-01-29 ENCOUNTER — OFFICE VISIT (OUTPATIENT)
Dept: OBSTETRICS AND GYNECOLOGY | Facility: CLINIC | Age: 49
End: 2024-01-29
Payer: COMMERCIAL

## 2024-01-29 VITALS
DIASTOLIC BLOOD PRESSURE: 76 MMHG | HEIGHT: 62 IN | WEIGHT: 140.19 LBS | BODY MASS INDEX: 25.8 KG/M2 | SYSTOLIC BLOOD PRESSURE: 112 MMHG

## 2024-01-29 DIAGNOSIS — E11.9 TYPE 2 DIABETES MELLITUS WITHOUT COMPLICATION, WITHOUT LONG-TERM CURRENT USE OF INSULIN: ICD-10-CM

## 2024-01-29 DIAGNOSIS — I10 PRIMARY HYPERTENSION: ICD-10-CM

## 2024-01-29 DIAGNOSIS — Z12.31 VISIT FOR SCREENING MAMMOGRAM: ICD-10-CM

## 2024-01-29 DIAGNOSIS — Z01.419 WOMEN'S ANNUAL ROUTINE GYNECOLOGICAL EXAMINATION: Primary | ICD-10-CM

## 2024-01-29 DIAGNOSIS — Z30.41 ENCOUNTER FOR SURVEILLANCE OF CONTRACEPTIVE PILLS: ICD-10-CM

## 2024-01-29 PROCEDURE — 1159F MED LIST DOCD IN RCRD: CPT | Mod: CPTII,S$GLB,, | Performed by: OBSTETRICS & GYNECOLOGY

## 2024-01-29 PROCEDURE — 99396 PREV VISIT EST AGE 40-64: CPT | Mod: S$GLB,,, | Performed by: OBSTETRICS & GYNECOLOGY

## 2024-01-29 PROCEDURE — 3078F DIAST BP <80 MM HG: CPT | Mod: CPTII,S$GLB,, | Performed by: OBSTETRICS & GYNECOLOGY

## 2024-01-29 PROCEDURE — 99999 PR PBB SHADOW E&M-EST. PATIENT-LVL III: CPT | Mod: PBBFAC,,, | Performed by: OBSTETRICS & GYNECOLOGY

## 2024-01-29 PROCEDURE — 1160F RVW MEDS BY RX/DR IN RCRD: CPT | Mod: CPTII,S$GLB,, | Performed by: OBSTETRICS & GYNECOLOGY

## 2024-01-29 PROCEDURE — 3008F BODY MASS INDEX DOCD: CPT | Mod: CPTII,S$GLB,, | Performed by: OBSTETRICS & GYNECOLOGY

## 2024-01-29 PROCEDURE — 3074F SYST BP LT 130 MM HG: CPT | Mod: CPTII,S$GLB,, | Performed by: OBSTETRICS & GYNECOLOGY

## 2024-01-29 RX ORDER — DROSPIRENONE 4 MG/1
4 TABLET, FILM COATED ORAL DAILY
Qty: 84 TABLET | Refills: 3 | Status: SHIPPED | OUTPATIENT
Start: 2024-01-29

## 2024-02-01 RX ORDER — LISINOPRIL 10 MG/1
TABLET ORAL
Qty: 180 TABLET | Refills: 0 | Status: SHIPPED | OUTPATIENT
Start: 2024-02-01

## 2024-02-01 NOTE — TELEPHONE ENCOUNTER
Provider Staff:  Action required for this patient    Requires labs      Please see care gap opportunities below in Care Due Message.    Thanks!  [facility]     Appointments      Date Provider   Last Visit   5/4/2023 Fausto Hammonds MD   Next Visit   5/6/2024 Fausto Hammonds MD     Refill Decision Note   Nahed Mcginnis  is requesting a refill authorization.  Brief Assessment and Rationale for Refill:  Approve     Medication Therapy Plan:        Comments:     Note composed:11:49 AM 02/01/2024

## 2024-02-01 NOTE — TELEPHONE ENCOUNTER
Care Due:                  Date            Visit Type   Department     Provider  --------------------------------------------------------------------------------                                MYCHART                              ANNUAL                              CHECKUP/PHY  [facility] INTERNAL  Last Visit: 05-      S            MEDICINE       Tarek C  Cassius                              [facility]                              ANNUAL                              CHECKUP/Y  [facility] INTERNAL  Next Visit: 05-      S            MEDICINE       Tarek C  Balamannayeli                                                            Last  Test          Frequency    Reason                     Performed    Due Date  --------------------------------------------------------------------------------    Lipid Panel.  12 months..  atorvastatin.............  12- 12-    [facility] [employer] Embedded Care Due Messages. Reference number: [id number].   2/01/2024 8:02:02 AM CST

## 2024-02-07 ENCOUNTER — PATIENT MESSAGE (OUTPATIENT)
Dept: INTERNAL MEDICINE | Facility: CLINIC | Age: 49
End: 2024-02-07
Payer: COMMERCIAL

## 2024-02-07 DIAGNOSIS — Z00.00 PHYSICAL EXAM, ANNUAL: Primary | ICD-10-CM

## 2024-02-09 RX ORDER — OMEPRAZOLE 20 MG/1
20 CAPSULE, DELAYED RELEASE ORAL
Qty: 90 CAPSULE | Refills: 0 | Status: SHIPPED | OUTPATIENT
Start: 2024-02-09

## 2024-02-09 NOTE — TELEPHONE ENCOUNTER
No care due was identified.  [facility] Embedded Care Due Messages. Reference number: [id number].   2/09/2024 8:02:00 AM CST

## 2024-02-09 NOTE — TELEPHONE ENCOUNTER
Refill Decision Note   Nhaed Mcginnis  is requesting a refill authorization.  Brief Assessment and Rationale for Refill:  Approve     Medication Therapy Plan:         Comments:     Note composed:8:16 AM 02/09/2024             Appointments     Last Visit   5/4/2023 Fausto Hammonds MD   Next Visit   5/6/2024 Fausto Hammonds MD

## 2024-03-08 ENCOUNTER — OFFICE VISIT (OUTPATIENT)
Dept: URGENT CARE | Facility: CLINIC | Age: 49
End: 2024-03-08
Payer: COMMERCIAL

## 2024-03-08 VITALS
HEART RATE: 89 BPM | WEIGHT: 140 LBS | TEMPERATURE: 100 F | BODY MASS INDEX: 25.76 KG/M2 | RESPIRATION RATE: 16 BRPM | SYSTOLIC BLOOD PRESSURE: 117 MMHG | OXYGEN SATURATION: 98 % | HEIGHT: 62 IN | DIASTOLIC BLOOD PRESSURE: 77 MMHG

## 2024-03-08 DIAGNOSIS — R52 GENERALIZED BODY ACHES: ICD-10-CM

## 2024-03-08 DIAGNOSIS — R11.2 NAUSEA AND VOMITING, UNSPECIFIED VOMITING TYPE: ICD-10-CM

## 2024-03-08 DIAGNOSIS — J10.1 INFLUENZA A: Primary | ICD-10-CM

## 2024-03-08 LAB
CTP QC/QA: YES
POC MOLECULAR INFLUENZA A AGN: POSITIVE
POC MOLECULAR INFLUENZA B AGN: NEGATIVE

## 2024-03-08 PROCEDURE — 99214 OFFICE O/P EST MOD 30 MIN: CPT | Mod: S$GLB,,, | Performed by: SURGERY

## 2024-03-08 PROCEDURE — 87502 INFLUENZA DNA AMP PROBE: CPT | Mod: QW,S$GLB,,

## 2024-03-08 RX ORDER — ONDANSETRON 4 MG/1
4 TABLET, ORALLY DISINTEGRATING ORAL
Status: COMPLETED | OUTPATIENT
Start: 2024-03-08 | End: 2024-03-08

## 2024-03-08 RX ORDER — ONDANSETRON 4 MG/1
4 TABLET, ORALLY DISINTEGRATING ORAL EVERY 8 HOURS PRN
Qty: 12 TABLET | Refills: 0 | Status: SHIPPED | OUTPATIENT
Start: 2024-03-08

## 2024-03-08 RX ORDER — PROMETHAZINE HYDROCHLORIDE AND DEXTROMETHORPHAN HYDROBROMIDE 6.25; 15 MG/5ML; MG/5ML
5 SYRUP ORAL EVERY 4 HOURS PRN
Qty: 180 ML | Refills: 0 | Status: SHIPPED | OUTPATIENT
Start: 2024-03-08 | End: 2024-03-15

## 2024-03-08 RX ORDER — OSELTAMIVIR PHOSPHATE 75 MG/1
75 CAPSULE ORAL 2 TIMES DAILY
Qty: 10 CAPSULE | Refills: 0 | Status: SHIPPED | OUTPATIENT
Start: 2024-03-08 | End: 2024-03-13

## 2024-03-08 RX ADMIN — ONDANSETRON 4 MG: 4 TABLET, ORALLY DISINTEGRATING ORAL at 10:03

## 2024-03-08 NOTE — PROGRESS NOTES
"Subjective:      Patient ID: Nahed Mcginnis is a 48 y.o. female.    Vitals:  height is 5' 2" (1.575 m) and weight is 63.5 kg (140 lb). Her tympanic temperature is 99.9 °F (37.7 °C). Her blood pressure is 117/77 and her pulse is 89. Her respiration is 16 and oxygen saturation is 98%.     Chief Complaint: URI    Several co workers at work have the flu and has been exposed  Sinus pressure with body aches x 2 days.  Vomiting and nausea off and on x 2 days.  Chills x 2 days.    URI   This is a new problem. The current episode started in the past 7 days. The problem has been waxing and waning. There has been no fever. The fever has been present for 1 to 2 days. Associated symptoms include congestion, coughing, headaches, joint pain, sinus pain and sneezing. She has tried acetaminophen for the symptoms. The treatment provided no relief.       HENT:  Positive for congestion and sinus pain.    Respiratory:  Positive for cough.    Allergic/Immunologic: Positive for sneezing.   Neurological:  Positive for headaches.      Objective:     Physical Exam   Constitutional: She is oriented to person, place, and time. She appears well-developed. She is cooperative.  Non-toxic appearance. She does not appear ill. No distress.   HENT:   Head: Normocephalic and atraumatic.   Ears:   Right Ear: Hearing, tympanic membrane, external ear and ear canal normal.   Left Ear: Hearing, tympanic membrane, external ear and ear canal normal.   Nose: Nose normal. No mucosal edema, rhinorrhea or nasal deformity. No epistaxis. Right sinus exhibits no maxillary sinus tenderness and no frontal sinus tenderness. Left sinus exhibits no maxillary sinus tenderness and no frontal sinus tenderness.   Mouth/Throat: Uvula is midline, oropharynx is clear and moist and mucous membranes are normal. No trismus in the jaw. Normal dentition. No uvula swelling. No oropharyngeal exudate, posterior oropharyngeal edema or posterior oropharyngeal erythema.   Eyes: " Conjunctivae and lids are normal. No scleral icterus.   Neck: Trachea normal and phonation normal. Neck supple. No edema present. No erythema present. No neck rigidity present.   Cardiovascular: Normal rate, regular rhythm, normal heart sounds and normal pulses.   Pulmonary/Chest: Effort normal and breath sounds normal. No respiratory distress. She has no decreased breath sounds. She has no wheezes. She has no rhonchi.   Occasional dry cough         Comments: Occasional dry cough    Abdominal: Normal appearance.   Musculoskeletal: Normal range of motion.         General: No deformity. Normal range of motion.   Neurological: She is alert and oriented to person, place, and time. She exhibits normal muscle tone. Coordination normal.   Skin: Skin is warm, dry, intact, not diaphoretic and not pale.   Psychiatric: Her speech is normal and behavior is normal. Judgment and thought content normal.   Nursing note and vitals reviewed.      Assessment:     1. Influenza A    2. Generalized body aches    3. Nausea and vomiting, unspecified vomiting type        Plan:       Influenza A    Generalized body aches  -     POCT Influenza A/B MOLECULAR    Nausea and vomiting, unspecified vomiting type  -     ondansetron (ZOFRAN-ODT) 4 MG TbDL; Take 1 tablet (4 mg total) by mouth every 8 (eight) hours as needed (nausea).  Dispense: 12 tablet; Refill: 0  -     ondansetron disintegrating tablet 4 mg  -     oseltamivir (TAMIFLU) 75 MG capsule; Take 1 capsule (75 mg total) by mouth 2 (two) times daily. for 5 days  Dispense: 10 capsule; Refill: 0  -     promethazine-dextromethorphan (PROMETHAZINE-DM) 6.25-15 mg/5 mL Syrp; Take 5 mLs by mouth every 4 (four) hours as needed (cough).  Dispense: 180 mL; Refill: 0      Results for orders placed or performed in visit on 03/08/24   POCT Influenza A/B MOLECULAR   Result Value Ref Range    POC Molecular Influenza A Ag Positive (A) Negative, Not Reported    POC Molecular Influenza B Ag Negative  Negative, Not Reported     Acceptable Yes

## 2024-03-08 NOTE — LETTER
March 8, 2024      [facility]  [address] [place] [address]  Phone: [phone]  Fax: [phone]       Patient: Nahed Mcginnis   YOB: 1975  Date of Visit: 03/08/2024    To Whom It May Concern:    Nereyda Mcginnis  was at [facility] on 03/08/2024. The patient may return to work/school on 03/11/2024 with no restrictions. If you have any questions or concerns, or if I can be of further assistance, please do not hesitate to contact me.    Sincerely,      Shelby Ho MD

## 2024-03-11 ENCOUNTER — PATIENT MESSAGE (OUTPATIENT)
Dept: INTERNAL MEDICINE | Facility: CLINIC | Age: 49
End: 2024-03-11
Payer: COMMERCIAL

## 2024-03-11 ENCOUNTER — TELEPHONE (OUTPATIENT)
Dept: INTERNAL MEDICINE | Facility: CLINIC | Age: 49
End: 2024-03-11
Payer: COMMERCIAL

## 2024-03-12 ENCOUNTER — PATIENT MESSAGE (OUTPATIENT)
Dept: INTERNAL MEDICINE | Facility: CLINIC | Age: 49
End: 2024-03-12
Payer: COMMERCIAL

## 2024-03-12 DIAGNOSIS — D64.9 ANEMIA, UNSPECIFIED TYPE: Primary | ICD-10-CM

## 2024-03-12 NOTE — TELEPHONE ENCOUNTER
Please advise pt Tested positive for Flu.Pt was evaluated at ER. Pt is concerned about low BP reading. Pt wants to know should she start her BP Pills again ?

## 2024-03-14 ENCOUNTER — PATIENT MESSAGE (OUTPATIENT)
Dept: INTERNAL MEDICINE | Facility: CLINIC | Age: 49
End: 2024-03-14
Payer: COMMERCIAL

## 2024-03-15 ENCOUNTER — TELEPHONE (OUTPATIENT)
Dept: INTERNAL MEDICINE | Facility: CLINIC | Age: 49
End: 2024-03-15
Payer: COMMERCIAL

## 2024-03-15 VITALS — SYSTOLIC BLOOD PRESSURE: 105 MMHG | DIASTOLIC BLOOD PRESSURE: 80 MMHG

## 2024-03-15 NOTE — TELEPHONE ENCOUNTER
----- Message from Fausto Hammonds MD sent at 3/14/2024  5:05 PM CDT -----  Please facilitate 2 week repeat CBC/TIBC/ferritin  Thank you for your time.

## 2024-03-15 NOTE — TELEPHONE ENCOUNTER
Your labs have returned indicative of a severe iron deficiency likely driving your anemia.  I have placed an order for a screening colonoscopy to rule out the potential for colonic polyps leading to called intestinal bleeding as the source of your iron deficiency.  I have also called in a prescription strength supplement. I would like you to take this daily with an acidic drink (orange juice, etc.) to aid absorption. If it induces constipation, you may take it every other day to every 3rd day.  We can repeat your iron studies in approximately 2 weeks to ensure stability/improvement and will be reaching out to facilitate that.     If you are experiencing heavy or prolonged menstrual cycles, please return to see your gyn to discuss alternative contraception options to limit further blood loss.

## 2024-03-20 LAB
LEFT EYE DM RETINOPATHY: NEGATIVE
RIGHT EYE DM RETINOPATHY: NEGATIVE

## 2024-04-08 ENCOUNTER — PATIENT MESSAGE (OUTPATIENT)
Dept: INTERNAL MEDICINE | Facility: CLINIC | Age: 49
End: 2024-04-08
Payer: COMMERCIAL

## 2024-04-11 ENCOUNTER — PATIENT MESSAGE (OUTPATIENT)
Dept: DIABETES | Facility: CLINIC | Age: 49
End: 2024-04-11
Payer: COMMERCIAL

## 2024-04-11 DIAGNOSIS — E11.9 TYPE 2 DIABETES MELLITUS WITHOUT COMPLICATION, WITHOUT LONG-TERM CURRENT USE OF INSULIN: ICD-10-CM

## 2024-04-11 RX ORDER — SEMAGLUTIDE 2.68 MG/ML
2 INJECTION, SOLUTION SUBCUTANEOUS
Qty: 3 ML | Refills: 4 | Status: SHIPPED | OUTPATIENT
Start: 2024-04-11 | End: 2024-04-11 | Stop reason: SDUPTHER

## 2024-04-11 RX ORDER — SEMAGLUTIDE 2.68 MG/ML
2 INJECTION, SOLUTION SUBCUTANEOUS
Qty: 3 ML | Refills: 4 | Status: SHIPPED | OUTPATIENT
Start: 2024-04-11 | End: 2025-04-11

## 2024-04-19 ENCOUNTER — PATIENT MESSAGE (OUTPATIENT)
Dept: ADMINISTRATIVE | Facility: HOSPITAL | Age: 49
End: 2024-04-19
Payer: COMMERCIAL

## 2024-04-23 ENCOUNTER — PATIENT OUTREACH (OUTPATIENT)
Dept: ADMINISTRATIVE | Facility: HOSPITAL | Age: 49
End: 2024-04-23
Payer: COMMERCIAL

## 2024-04-23 NOTE — PROGRESS NOTES
Population Health Chart Review & Patient Outreach Details      Additional [facility] Health Notes:               Updates Requested / Reviewed:      Care Everywhere, , and Immunizations Reconciliation Completed or Queried: [place]         Health Maintenance Topics Overdue:      [facility] Score: 1     Eye Exam                       Health Maintenance Topic(s) Outreach Outcomes & Actions Taken:    Primary Care Appt - Outreach Outcomes & Actions Taken  : Primary Care Appt Scheduled    Lab(s) - Outreach Outcomes & Actions Taken  : Overdue Lab(s) Scheduled    Colorectal Cancer Screening - Outreach Outcomes & Actions Taken  : Colonoscopy Case Request / Referral / Home Test Order Placed

## 2024-04-30 ENCOUNTER — PATIENT MESSAGE (OUTPATIENT)
Dept: OBSTETRICS AND GYNECOLOGY | Facility: CLINIC | Age: 49
End: 2024-04-30
Payer: COMMERCIAL

## 2024-04-30 DIAGNOSIS — R39.9 UTI SYMPTOMS: Primary | ICD-10-CM

## 2024-05-06 ENCOUNTER — OFFICE VISIT (OUTPATIENT)
Dept: INTERNAL MEDICINE | Facility: CLINIC | Age: 49
End: 2024-05-06
Payer: COMMERCIAL

## 2024-05-06 VITALS
WEIGHT: 143.94 LBS | DIASTOLIC BLOOD PRESSURE: 86 MMHG | HEART RATE: 98 BPM | SYSTOLIC BLOOD PRESSURE: 118 MMHG | TEMPERATURE: 98 F | HEIGHT: 62 IN | RESPIRATION RATE: 16 BRPM | BODY MASS INDEX: 26.49 KG/M2 | OXYGEN SATURATION: 98 %

## 2024-05-06 DIAGNOSIS — E61.1 IRON DEFICIENCY: ICD-10-CM

## 2024-05-06 DIAGNOSIS — E11.9 TYPE 2 DIABETES MELLITUS WITHOUT COMPLICATION, WITHOUT LONG-TERM CURRENT USE OF INSULIN: ICD-10-CM

## 2024-05-06 DIAGNOSIS — Z00.00 PHYSICAL EXAM, ANNUAL: Primary | ICD-10-CM

## 2024-05-06 PROCEDURE — 99396 PREV VISIT EST AGE 40-64: CPT | Mod: S$GLB,,, | Performed by: STUDENT IN AN ORGANIZED HEALTH CARE EDUCATION/TRAINING PROGRAM

## 2024-05-06 PROCEDURE — 3079F DIAST BP 80-89 MM HG: CPT | Mod: CPTII,S$GLB,, | Performed by: STUDENT IN AN ORGANIZED HEALTH CARE EDUCATION/TRAINING PROGRAM

## 2024-05-06 PROCEDURE — 3074F SYST BP LT 130 MM HG: CPT | Mod: CPTII,S$GLB,, | Performed by: STUDENT IN AN ORGANIZED HEALTH CARE EDUCATION/TRAINING PROGRAM

## 2024-05-06 PROCEDURE — 3066F NEPHROPATHY DOC TX: CPT | Mod: CPTII,S$GLB,, | Performed by: STUDENT IN AN ORGANIZED HEALTH CARE EDUCATION/TRAINING PROGRAM

## 2024-05-06 PROCEDURE — 3044F HG A1C LEVEL LT 7.0%: CPT | Mod: CPTII,S$GLB,, | Performed by: STUDENT IN AN ORGANIZED HEALTH CARE EDUCATION/TRAINING PROGRAM

## 2024-05-06 PROCEDURE — 3008F BODY MASS INDEX DOCD: CPT | Mod: CPTII,S$GLB,, | Performed by: STUDENT IN AN ORGANIZED HEALTH CARE EDUCATION/TRAINING PROGRAM

## 2024-05-06 PROCEDURE — 3061F NEG MICROALBUMINURIA REV: CPT | Mod: CPTII,S$GLB,, | Performed by: STUDENT IN AN ORGANIZED HEALTH CARE EDUCATION/TRAINING PROGRAM

## 2024-05-06 PROCEDURE — 4010F ACE/ARB THERAPY RXD/TAKEN: CPT | Mod: CPTII,S$GLB,, | Performed by: STUDENT IN AN ORGANIZED HEALTH CARE EDUCATION/TRAINING PROGRAM

## 2024-05-06 PROCEDURE — 1159F MED LIST DOCD IN RCRD: CPT | Mod: CPTII,S$GLB,, | Performed by: STUDENT IN AN ORGANIZED HEALTH CARE EDUCATION/TRAINING PROGRAM

## 2024-05-06 PROCEDURE — 99999 PR PBB SHADOW E&M-EST. PATIENT-LVL IV: CPT | Mod: PBBFAC,,, | Performed by: STUDENT IN AN ORGANIZED HEALTH CARE EDUCATION/TRAINING PROGRAM

## 2024-05-06 NOTE — PROGRESS NOTES
Subjective:         Chief Complaint: Annual Exam    HPI  Ms. Nahed Mcginnis is a 48-year-old woman with extremely well controlled type 2 diabetes (Ozempic; following with endocrinology), hypertension (formally maintained on metoprolol succinate 25, lisinopril 10, and 1/2 of spironolactone 25; no longer requiring treatment following significant weight loss), and hypothyroidism 50 (levothyroxine 50) presenting for annual physical:    Annual screening labs gathered in preparation for this appointment:  -A1c:  5.6   -TSH: Within normal limits   -CBC:  Negligible/stable hemoglobin/hematocrit insufficiency   -lipid panel:  Extremely well controlled   -CMP:  Negligible CO2 insufficiency only   -proteinuria screening:  Also negligible; however, indolently increasing (6--> 28--> 128)    Iron deficiency:    -continues oral iron and is impending baseline colonoscopy    Review of Systems   Constitutional:  Negative for activity change and unexpected weight change.   HENT:  Negative for hearing loss, rhinorrhea and trouble swallowing.    Eyes:  Negative for discharge and visual disturbance.   Respiratory:  Negative for chest tightness and wheezing.    Cardiovascular:  Negative for chest pain and palpitations.   Gastrointestinal:  Negative for blood in stool, constipation, diarrhea and vomiting.   Endocrine: Negative for polydipsia and polyuria.   Genitourinary:  Negative for difficulty urinating, dysuria, hematuria and menstrual problem.   Musculoskeletal:  Negative for arthralgias, joint swelling and neck pain.   Neurological:  Negative for weakness and headaches.   Psychiatric/Behavioral:  Negative for confusion and dysphoric mood.          Objective:      Vitals:    05/06/24 0956   BP: 118/86   Pulse: 98   Resp: 16   Temp: 98.2 °F (36.8 °C)      Physical Exam  Vitals reviewed.   Constitutional:       General: She is not in acute distress.     Appearance: Normal appearance.   HENT:      Head: Normocephalic and atraumatic.       Comments: Facial features are symmetric      Nose: Nose normal. No congestion or rhinorrhea.      Mouth/Throat:      Mouth: Mucous membranes are moist.      Pharynx: Oropharynx is clear. No oropharyngeal exudate or posterior oropharyngeal erythema.   Eyes:      General: No scleral icterus.     Extraocular Movements: Extraocular movements intact.      Conjunctiva/sclera: Conjunctivae normal.   Cardiovascular:      Rate and Rhythm: Normal rate and regular rhythm.      Pulses: Normal pulses.      Heart sounds: Normal heart sounds.   Pulmonary:      Effort: Pulmonary effort is normal. No respiratory distress.      Breath sounds: Normal breath sounds.   Musculoskeletal:         General: No deformity or signs of injury. Normal range of motion.      Cervical back: Normal range of motion.      Comments: Gait normal    Feet:      Comments:   Protective Sensation (w/ 10 gram monofilament):  Right: Intact  Left: Intact    Visual Inspection:  Normal -  Bilateral    Pedal Pulses:   Right: Present  Left: Present    Posterior Tibialis Pulses:   Right:Present  Left: Present      Skin:     General: Skin is warm and dry.      Findings: No rash.   Neurological:      General: No focal deficit present.      Mental Status: She is alert and oriented to person, place, and time. Mental status is at baseline.   Psychiatric:         Mood and Affect: Mood normal.         Behavior: Behavior normal.         Thought Content: Thought content normal.       Current Outpatient Medications on File Prior to Visit   Medication Sig Dispense Refill    atorvastatin (LIPITOR) 40 MG tablet Take 1 tablet (40 mg total) by mouth once daily. 90 tablet 3    drospirenone, contraceptive, (SLYND) 4 mg (28) Tab Take 1 tablet (4 mg total) by mouth Daily. 84 tablet 3    ferrous sulfate (IRON, FERROUS SULFATE,) 325 mg (65 mg iron) Tab tablet Take 1 tablet (325 mg total) by mouth daily with breakfast. 90 tablet 1    flash glucose sensor (FREESTYLE JENNY 2 SENSOR) Kit 1  each by Misc.(Non-Drug; Combo Route) route every 14 (fourteen) days. 2 kit 11    levothyroxine (SYNTHROID) 50 MCG tablet Take 1 tablet (50 mcg total) by mouth before breakfast. 90 tablet 3    omeprazole (PRILOSEC) 20 MG capsule TAKE 1 CAPSULE(20 MG) BY MOUTH EVERY DAY 90 capsule 0    semaglutide (OZEMPIC) 2 mg/dose (8 mg/3 mL) PnIj Inject 2 mg into the skin every 7 days. 3 mL 4    TRUE METRIX GLUCOSE TEST STRIP Strp Pt test her blood sugars 4 times a day 200 each 11    lisinopriL 10 MG tablet TAKE 2 TABLETS(20 MG) BY MOUTH EVERY DAY (Patient not taking: Reported on 5/6/2024) 180 tablet 0    metoprolol succinate (TOPROL-XL) 25 MG 24 hr tablet TAKE 1 TABLET(25 MG) BY MOUTH EVERY DAY (Patient not taking: Reported on 5/6/2024) 90 tablet 3    ondansetron (ZOFRAN) 4 MG tablet Take 1 tablet (4 mg total) by mouth every 6 (six) hours. (Patient not taking: Reported on 5/6/2024) 12 tablet 0    ondansetron (ZOFRAN-ODT) 4 MG TbDL Take 1 tablet (4 mg total) by mouth 2 (two) times daily as needed. (Patient not taking: Reported on 3/8/2024) 60 tablet 1    ondansetron (ZOFRAN-ODT) 4 MG TbDL Take 1 tablet (4 mg total) by mouth every 8 (eight) hours as needed (nausea). (Patient not taking: Reported on 5/6/2024) 12 tablet 0    terconazole (TERAZOL 3) 0.8 % vaginal cream Place 1 applicator vaginally every evening. (Patient not taking: Reported on 3/8/2024) 20 g 0     No current facility-administered medications on file prior to visit.         Assessment:       1. Physical exam, annual    2. Iron deficiency    3. Type 2 diabetes mellitus without complication, without long-term current use of insulin        Plan:       Physical exam, annual   - annual screening labs reviewed   - Tdap deferred    Iron deficiency  -     IRON AND TIBC; Future; Expected date: 05/06/2024  -     FERRITIN; Future; Expected date: 05/06/2024   - reassess necessity for prescription strength supplementation in approximately three-month; at that point, can  discontinue if sufficiently supplemented and colonoscopy is up-to-date    Type 2 diabetes mellitus without complication, without long-term current use of insulin   - diabetic foot exam updated   - control is excellent; will continue to follow with endocrinology

## 2024-05-08 ENCOUNTER — PATIENT OUTREACH (OUTPATIENT)
Dept: ADMINISTRATIVE | Facility: HOSPITAL | Age: 49
End: 2024-05-08
Payer: COMMERCIAL

## 2024-05-23 ENCOUNTER — CLINICAL SUPPORT (OUTPATIENT)
Dept: ENDOSCOPY | Facility: HOSPITAL | Age: 49
End: 2024-05-23
Attending: STUDENT IN AN ORGANIZED HEALTH CARE EDUCATION/TRAINING PROGRAM
Payer: COMMERCIAL

## 2024-05-23 ENCOUNTER — TELEPHONE (OUTPATIENT)
Dept: ENDOSCOPY | Facility: HOSPITAL | Age: 49
End: 2024-05-23

## 2024-05-23 DIAGNOSIS — D50.9 IRON DEFICIENCY ANEMIA, UNSPECIFIED IRON DEFICIENCY ANEMIA TYPE: ICD-10-CM

## 2024-05-23 NOTE — TELEPHONE ENCOUNTER
Contacted patient to schedule colonoscopy. [place] [facility] [place] available at this time. New PAT appt scheduled. Patient verbalized understanding.

## 2024-05-23 NOTE — PLAN OF CARE
Contacted patient to schedule colonoscopy. [place] wants [place] [facility] [place] No slots available at this time. New PAT appt scheduled. Patient verbalized understanding.

## 2024-06-27 ENCOUNTER — TELEPHONE (OUTPATIENT)
Dept: DIABETES | Facility: CLINIC | Age: 49
End: 2024-06-27
Payer: COMMERCIAL

## 2024-06-27 DIAGNOSIS — E11.65 UNCONTROLLED TYPE 2 DIABETES MELLITUS WITH HYPERGLYCEMIA: ICD-10-CM

## 2024-06-27 RX ORDER — FLASH GLUCOSE SENSOR
1 KIT MISCELLANEOUS
Qty: 2 KIT | Refills: 1 | Status: SHIPPED | OUTPATIENT
Start: 2024-06-27 | End: 2024-06-28

## 2024-06-27 NOTE — PROGRESS NOTES
CC:   Chief Complaint   Patient presents with    Diabetes Mellitus       HPI: Nahed Mcginnis is a 48 y.o. female presents for a follow up visit today for the management of T2DM.    She was diagnosed with Type 2 diabetes around 2019 on routine labs   Prediabetes over 10 years prior to T2DM dx     Family hx of diabetes: brothers, father   Hospitalized for diabetes: denies   Insulin therapy: denies     No personal or FH of thyroid cancer or personal of pancreatic cancer or pancreatitis.       Our last visit was in June of 2023  At that visit we cut back her Ozempic from 2 mg to 1 mg---in between visits we ended up going back to 2 mg weekly due to hyperglycemia  We continued her freestyle Manoj  Her recent A1c is well controlled at 5.6%.  Her blood sugars are well controlled on her Manoj download.  Please see attached  Occ nausea/vomiting that comes in waves ---she was attributing it to the possible stomach virus---she does not feel that it has the Ozempic  PCP took her off Lisinopril and Metoprolol             DIABETES COMPLICATIONS: none      Diabetes Management Status    ASA:  No    Statin: Taking-- Lipitor 40 mg   ACE/ARB:  not taking -- off     Screening or Prevention Patient's value Goal Complete/Controlled?   HgA1C Testing and Control   Lab Results   Component Value Date    HGBA1C 5.6 04/30/2024      Annually/Less than 8% Yes     Lipid profile : 04/30/2024 Annually Yes     LDL control Lab Results   Component Value Date    LDLCALC 54.8 (L) 04/30/2024    Annually/Less than 100 mg/dl  Yes     Nephropathy screening Lab Results   Component Value Date    LABMICR 128.0 04/30/2024     Lab Results   Component Value Date    PROTEINUA Negative 03/11/2024    Annually Yes     Blood pressure BP Readings from Last 1 Encounters:   06/28/24 130/84    Less than 140/90 Yes     Dilated retinal exam : 03/20/2024 Annually Yes     Foot exam   : 05/06/2024 Annually Yes         CURRENT A1C:    Hemoglobin A1C   Date Value Ref Range  Status   2024 5.6 4.0 - 5.6 % Final     Comment:     ADA Screening Guidelines:  5.7-6.4%  Consistent with prediabetes  >or=6.5%  Consistent with diabetes    High levels of fetal hemoglobin interfere with the HbA1C  assay. Heterozygous hemoglobin variants (HbS, HgC, etc)do  not significantly interfere with this assay.   However, presence of multiple variants may affect accuracy.     2023 5.5 4.0 - 5.6 % Final     Comment:     ADA Screening Guidelines:  5.7-6.4%  Consistent with prediabetes  >or=6.5%  Consistent with diabetes    High levels of fetal hemoglobin interfere with the HbA1C  assay. Heterozygous hemoglobin variants (HbS, HgC, etc)do  not significantly interfere with this assay.   However, presence of multiple variants may affect accuracy.     2022 5.9 (H) 4.0 - 5.6 % Final     Comment:     ADA Screening Guidelines:  5.7-6.4%  Consistent with prediabetes  >or=6.5%  Consistent with diabetes    High levels of fetal hemoglobin interfere with the HbA1C  assay. Heterozygous hemoglobin variants (HbS, HgC, etc)do  not significantly interfere with this assay.   However, presence of multiple variants may affect accuracy.         GOAL A1C: 6.5% without hypoglycemia       DM MEDICATIONS USED IN THE PAST: Ozempic   Metformin - gi side effects.   jardiance - yeast infections. Stopped in  -  DPP4 - Januvia  SGLT2 - jardiance - just restarted recently - however has new yeast infection.   Glimepiride 4mg with breakfast.  Ozempic       CURRENT DIABETES MEDICATIONS: Ozempic 2 mg weekly on Saturday AM   Insulin: N/A    Missed doses: denies         BLOOD GLUCOSE MONITORING:    Sensor type: Freestyle kalani 2   Average BG readin  Estimated A1c is 6%   Time in range: 99%  0% low   Very low- 0%   1% high   0% very high   Site change:  q14 days.      Sensor was downloaded in clinic today and reviewed with patient.   Please see attached document for download.        HYPOGLYCEMIA: 0% low   0% very low   Did  not confirm with a fingerstick         MEALS: eating 2 meals per day + snacks   BF: coffee and eggs or sometimes skips-- or fruit or something small   Lunch: 1/2 sandwich or grilled chicken or left overs   Dinner: skip usually -- something very light   Snack: rarely -- fruit -  Drinks: water        CURRENT EXERCISE:  gym 3-5 times per week       Review of Systems  Review of Systems   Constitutional:  Negative for appetite change, fatigue and unexpected weight change.   HENT:  Negative for trouble swallowing.    Eyes:  Negative for visual disturbance.   Respiratory:  Negative for shortness of breath.    Cardiovascular:  Negative for chest pain.   Gastrointestinal:  Positive for nausea and vomiting. Negative for abdominal distention, abdominal pain and constipation.   Endocrine: Negative for polydipsia, polyphagia and polyuria.   Genitourinary:         No Nocturia    Skin:  Negative for wound.   Neurological:  Negative for numbness.         Physical Exam   Physical Exam  Vitals and nursing note reviewed.   Constitutional:       General: She is not in acute distress.     Appearance: Normal appearance. She is well-developed. She is not ill-appearing.      Comments: Overweight female    HENT:      Head: Normocephalic and atraumatic.      Right Ear: External ear normal.      Left Ear: External ear normal.      Nose: Nose normal.   Neck:      Thyroid: No thyromegaly.      Trachea: No tracheal deviation.   Cardiovascular:      Rate and Rhythm: Normal rate and regular rhythm.      Heart sounds: No murmur heard.  Pulmonary:      Effort: Pulmonary effort is normal. No respiratory distress.      Breath sounds: Normal breath sounds.   Abdominal:      Palpations: Abdomen is soft.      Tenderness: There is no abdominal tenderness.      Hernia: No hernia is present.   Musculoskeletal:      Cervical back: Normal range of motion and neck supple.   Skin:     General: Skin is warm and dry.      Capillary Refill: Capillary refill takes  less than 2 seconds.      Findings: No rash.      Comments: Injection sites are normal appearing. No lipo hypertropthy or atrophy     Neurological:      Mental Status: She is alert and oriented to person, place, and time.      Cranial Nerves: No cranial nerve deficit.   Psychiatric:         Mood and Affect: Mood normal.         Behavior: Behavior normal.         Thought Content: Thought content normal.         Judgment: Judgment normal.         FOOT EXAMINATION:wearing sandals     Protective Sensation (w/ 10 gram monofilament):  Right: Intact  Left: Intact    Visual Inspection:  Normal -  Bilateral and Nails Intact - without Evidence of Foot Deformity- Bilateral    Pedal Pulses:   Right: Present  Left: Present    Posterior Tibialis Pulses:   Right:Present  Left: Present        Lab Results   Component Value Date    TSH 3.147 04/30/2024           Type 2 diabetes mellitus without complication, without long-term current use of insulin  Controlled   Blood sugar readings are well controlled on her freestyle Manoj download  We discussed changing her Ozempic to Mounjaro due to occasional nausea and vomiting but she deferred.  Sounds like nausea and vomiting is due to missed meals and prolonged eating---discussed it is ideal for her to have 3 meals a day to prevent this from happening  Reviewed that she could cut back on the Ozempic---dial back 25 clicks   Or consider changing to Mounjaro       -- Medication Changes:   For now continue ozempic 2 mg weekly   Let me know if you want to change to Mounjaro weekly   Dial back on ozempic if n/v persist       -- Reviewed goals of therapy are to get the best control we can without hypoglycemia.  -- Reviewed patient's current insulin regimen. Clarified proper insulin dose and timing in relation to meals, etc. Insulin injection sites and proper rotation instructed.    -- Advised frequent self blood glucose monitoring.  Patient encouraged to document glucose results and bring them to  every clinic visit.  Change to Manoj 3---she uses her phone as   -- Hypoglycemia precautions discussed. Instructed on precautions before driving.    -- Call for Bg repeatedly < 70 or > 180.   -- Close adherence to lifestyle changes recommended.   -- Periodic follow ups for eye evaluations, foot care and dental care suggested.          HTN (hypertension)  PCP took her off all blood pressure medications  Blood pressure well controlled today  Goal less than 140/90    Hypothyroidism due to Hashimoto's thyroiditis  + TPO   On LT4 50 mcg daily   TSH - higher end of goal    reinforced take by itself on empty stomach, first thing in the morning and wait at least 30-60 minutes to eat, drink, or take other medications.            Overweight (BMI 25.0-29.9)  Body mass index is 25.51 kg/m².  Increases insulin resistance.   Discussed DM diet and exercise.         I spent a total of 30 minutes on the day of the visit.This includes face to face time and non-face to face time preparing to see the patient (eg, review of tests), obtaining and/or reviewing separately obtained history, documenting clinical information in the electronic or other health record, independently interpreting results and communicating results to the patient/family/caregiver, or care coordinator.        Follow up in about 1 year (around 6/28/2025).  See PCP in 6 months   Follow up with me in 1 year       No orders of the defined types were placed in this encounter.      Recommendations were discussed with the patient in detail  The patient verbalized understanding and agrees with the plan outlined as above.     This note was partly generated with [employer] voice recognition software. I apologize for any possible typographical errors.

## 2024-06-28 ENCOUNTER — OFFICE VISIT (OUTPATIENT)
Dept: DIABETES | Facility: CLINIC | Age: 49
End: 2024-06-28
Payer: COMMERCIAL

## 2024-06-28 VITALS
DIASTOLIC BLOOD PRESSURE: 84 MMHG | SYSTOLIC BLOOD PRESSURE: 130 MMHG | BODY MASS INDEX: 25.67 KG/M2 | OXYGEN SATURATION: 99 % | HEIGHT: 62 IN | HEART RATE: 91 BPM | WEIGHT: 139.5 LBS

## 2024-06-28 DIAGNOSIS — E06.3 HYPOTHYROIDISM DUE TO HASHIMOTO'S THYROIDITIS: ICD-10-CM

## 2024-06-28 DIAGNOSIS — Z71.9 HEALTH EDUCATION/COUNSELING: ICD-10-CM

## 2024-06-28 DIAGNOSIS — I10 PRIMARY HYPERTENSION: Chronic | ICD-10-CM

## 2024-06-28 DIAGNOSIS — E11.9 TYPE 2 DIABETES MELLITUS WITHOUT COMPLICATION, WITHOUT LONG-TERM CURRENT USE OF INSULIN: Primary | ICD-10-CM

## 2024-06-28 DIAGNOSIS — E03.8 HYPOTHYROIDISM DUE TO HASHIMOTO'S THYROIDITIS: ICD-10-CM

## 2024-06-28 DIAGNOSIS — E66.3 OVERWEIGHT (BMI 25.0-29.9): ICD-10-CM

## 2024-06-28 PROCEDURE — 99999 PR PBB SHADOW E&M-EST. PATIENT-LVL IV: CPT | Mod: PBBFAC,,, | Performed by: NURSE PRACTITIONER

## 2024-06-28 RX ORDER — SEMAGLUTIDE 2.68 MG/ML
2 INJECTION, SOLUTION SUBCUTANEOUS
Qty: 3 ML | Refills: 11 | Status: SHIPPED | OUTPATIENT
Start: 2024-06-28 | End: 2025-06-28

## 2024-06-28 RX ORDER — BLOOD-GLUCOSE SENSOR
1 EACH MISCELLANEOUS
Qty: 2 EACH | Refills: 11 | Status: SHIPPED | OUTPATIENT
Start: 2024-06-28 | End: 2025-06-28

## 2024-06-28 NOTE — ASSESSMENT & PLAN NOTE
PCP took her off all blood pressure medications  Blood pressure well controlled today  Goal less than 140/90

## 2024-06-28 NOTE — ASSESSMENT & PLAN NOTE
Body mass index is 25.51 kg/m².  Increases insulin resistance.   Discussed DM diet and exercise.

## 2024-06-28 NOTE — ASSESSMENT & PLAN NOTE
Controlled   Blood sugar readings are well controlled on her freestyle Manoj download  We discussed changing her Ozempic to Mounjaro due to occasional nausea and vomiting but she deferred.  Sounds like nausea and vomiting is due to missed meals and prolonged eating---discussed it is ideal for her to have 3 meals a day to prevent this from happening  Reviewed that she could cut back on the Ozempic---dial back 25 clicks   Or consider changing to Mounjaro       -- Medication Changes:   For now continue ozempic 2 mg weekly   Let me know if you want to change to Mounjaro weekly   Dial back on ozempic if n/v persist       -- Reviewed goals of therapy are to get the best control we can without hypoglycemia.  -- Reviewed patient's current insulin regimen. Clarified proper insulin dose and timing in relation to meals, etc. Insulin injection sites and proper rotation instructed.    -- Advised frequent self blood glucose monitoring.  Patient encouraged to document glucose results and bring them to every clinic visit.  Change to Manoj 3---she uses her phone as   -- Hypoglycemia precautions discussed. Instructed on precautions before driving.    -- Call for Bg repeatedly < 70 or > 180.   -- Close adherence to lifestyle changes recommended.   -- Periodic follow ups for eye evaluations, foot care and dental care suggested.

## 2024-06-28 NOTE — ASSESSMENT & PLAN NOTE
+ TPO   On LT4 50 mcg daily   TSH - higher end of goal    reinforced take by itself on empty stomach, first thing in the morning and wait at least 30-60 minutes to eat, drink, or take other medications.

## 2024-07-05 ENCOUNTER — TELEPHONE (OUTPATIENT)
Dept: INTERNAL MEDICINE | Facility: CLINIC | Age: 49
End: 2024-07-05
Payer: COMMERCIAL

## 2024-07-05 RX ORDER — ATORVASTATIN CALCIUM 40 MG/1
40 TABLET, FILM COATED ORAL DAILY
COMMUNITY

## 2024-07-16 RX ORDER — OMEPRAZOLE 20 MG/1
20 CAPSULE, DELAYED RELEASE ORAL DAILY
Qty: 90 CAPSULE | Refills: 3 | Status: SHIPPED | OUTPATIENT
Start: 2024-07-16

## 2024-07-16 NOTE — TELEPHONE ENCOUNTER
Refill Decision Note   Nahed Mcginnis  is requesting a refill authorization.  Brief Assessment and Rationale for Refill:  Approve     Medication Therapy Plan:         Comments:     Note composed:2:01 PM 07/16/2024             Appointments     Last Visit   5/6/2024 Fausto Hammonds MD   Next Visit   Visit date not found Fausto Hammonds MD

## 2024-07-16 NOTE — TELEPHONE ENCOUNTER
No care due was identified.  [facility] Embedded Care Due Messages. Reference number: [id number].   7/16/2024 7:47:08 AM CDT

## 2024-07-23 ENCOUNTER — CLINICAL SUPPORT (OUTPATIENT)
Dept: ENDOSCOPY | Facility: HOSPITAL | Age: 49
End: 2024-07-23
Attending: STUDENT IN AN ORGANIZED HEALTH CARE EDUCATION/TRAINING PROGRAM
Payer: COMMERCIAL

## 2024-07-23 ENCOUNTER — TELEPHONE (OUTPATIENT)
Dept: ENDOSCOPY | Facility: HOSPITAL | Age: 49
End: 2024-07-23

## 2024-07-23 DIAGNOSIS — Z12.11 SCREEN FOR COLON CANCER: Primary | ICD-10-CM

## 2024-07-23 DIAGNOSIS — D50.9 IRON DEFICIENCY ANEMIA, UNSPECIFIED IRON DEFICIENCY ANEMIA TYPE: ICD-10-CM

## 2024-07-23 RX ORDER — SODIUM, POTASSIUM,MAG SULFATES 17.5-3.13G
1 SOLUTION, RECONSTITUTED, ORAL ORAL DAILY
Qty: 1 KIT | Refills: 0 | Status: SHIPPED | OUTPATIENT
Start: 2024-07-23 | End: 2024-07-25

## 2024-07-23 NOTE — TELEPHONE ENCOUNTER
Spoke to pt to schedule procedure(s) Colonoscopy       Physician to perform procedure(s) Dr. FAUSTINA Clements  Date of Procedure (s) 10/25/24  Arrival Time 9:45 AM  Time of Procedure(s) 10:45 AM   Location of Procedure(s) [facility]  Type of Rx Prep sent to patient: Suprep  Instructions provided to patient via [facility]    Patient was informed on the following information and verbalized understanding. Screening questionnaire reviewed with patient and complete. If procedure requires anesthesia, a responsible adult needs to be present to accompany the patient home, patient cannot drive after receiving anesthesia. Appointment details are tentative, especially check-in time. Patient will receive a prep-op call 7 days prior to confirm check-in time for procedure. If applicable the patient should contact their pharmacy to verify Rx for procedure prep is ready for pick-up. Patient was advised to call the scheduling department at [phone] if pharmacy states no Rx is available. Patient was advised to call the endoscopy scheduling department if any questions or concerns arise.      [facility] Scheduling Department

## 2024-08-06 ENCOUNTER — PATIENT MESSAGE (OUTPATIENT)
Dept: DIABETES | Facility: CLINIC | Age: 49
End: 2024-08-06
Payer: COMMERCIAL

## 2024-08-07 ENCOUNTER — PATIENT MESSAGE (OUTPATIENT)
Dept: INTERNAL MEDICINE | Facility: CLINIC | Age: 49
End: 2024-08-07
Payer: COMMERCIAL

## 2024-08-13 RX ORDER — LEVOTHYROXINE SODIUM 50 UG/1
50 TABLET ORAL
Qty: 90 TABLET | Refills: 3 | Status: SHIPPED | OUTPATIENT
Start: 2024-08-13

## 2024-08-13 NOTE — TELEPHONE ENCOUNTER
No care due was identified.  [facility] Embedded Care Due Messages. Reference number: [id number].   8/13/2024 9:56:00 AM CDT

## 2024-08-14 ENCOUNTER — PATIENT MESSAGE (OUTPATIENT)
Dept: DIABETES | Facility: CLINIC | Age: 49
End: 2024-08-14
Payer: COMMERCIAL

## 2024-08-16 ENCOUNTER — OFFICE VISIT (OUTPATIENT)
Dept: URGENT CARE | Facility: CLINIC | Age: 49
End: 2024-08-16
Payer: COMMERCIAL

## 2024-08-16 VITALS
SYSTOLIC BLOOD PRESSURE: 136 MMHG | RESPIRATION RATE: 17 BRPM | DIASTOLIC BLOOD PRESSURE: 91 MMHG | HEIGHT: 62 IN | OXYGEN SATURATION: 98 % | TEMPERATURE: 98 F | HEART RATE: 86 BPM | WEIGHT: 139 LBS | BODY MASS INDEX: 25.58 KG/M2

## 2024-08-16 DIAGNOSIS — R05.9 COUGH, UNSPECIFIED TYPE: ICD-10-CM

## 2024-08-16 DIAGNOSIS — J32.9 BACTERIAL SINUSITIS: Primary | ICD-10-CM

## 2024-08-16 DIAGNOSIS — B96.89 BACTERIAL SINUSITIS: Primary | ICD-10-CM

## 2024-08-16 LAB
CTP QC/QA: YES
SARS-COV-2 AG RESP QL IA.RAPID: NEGATIVE

## 2024-08-16 PROCEDURE — 71046 X-RAY EXAM CHEST 2 VIEWS: CPT | Mod: FY,S$GLB,, | Performed by: RADIOLOGY

## 2024-08-16 RX ORDER — AMOXICILLIN AND CLAVULANATE POTASSIUM 875; 125 MG/1; MG/1
1 TABLET, FILM COATED ORAL EVERY 12 HOURS
Qty: 14 TABLET | Refills: 0 | Status: SHIPPED | OUTPATIENT
Start: 2024-08-16 | End: 2024-08-23

## 2024-08-16 RX ORDER — PROMETHAZINE HYDROCHLORIDE AND DEXTROMETHORPHAN HYDROBROMIDE 6.25; 15 MG/5ML; MG/5ML
5 SYRUP ORAL EVERY 6 HOURS PRN
Qty: 118 ML | Refills: 0 | Status: SHIPPED | OUTPATIENT
Start: 2024-08-16 | End: 2024-08-26

## 2024-08-16 NOTE — PROGRESS NOTES
"Subjective:      Patient ID: Nahed Mcginnis is a 49 y.o. female.    Vitals:  height is 5' 2" (1.575 m) and weight is 63 kg (139 lb). Her oral temperature is 97.9 °F (36.6 °C). Her blood pressure is 136/91 (abnormal) and her pulse is 86. Her respiration is 17 and oxygen saturation is 98%.     Chief Complaint: Cough    This is a 49 y.o. female who presents today with a chief complaint of cough, congestion, ear pain. Sx started five days ago but two days ago she started a fever (max 100.6), patient states she is taking mucinex with some relief.     Cough  This is a new problem. The current episode started in the past 7 days. The problem has been gradually worsening. The cough is Productive of sputum. Associated symptoms include ear pain, a fever, headaches, nasal congestion and postnasal drip. Pertinent negatives include no chills, ear congestion, hemoptysis, myalgias, sore throat, shortness of breath, sweats, weight loss or wheezing. The symptoms are aggravated by lying down. There is no history of asthma.     Constitution: Positive for fever. Negative for chills.   HENT:  Positive for ear pain and postnasal drip. Negative for sore throat.    Respiratory:  Positive for cough. Negative for bloody sputum, shortness of breath and wheezing.    Musculoskeletal:  Negative for muscle ache.   Neurological:  Positive for headaches.      Objective:     Physical Exam   Constitutional: She is oriented to person, place, and time. She appears well-developed. She is cooperative.  Non-toxic appearance. She does not appear ill. No distress.   HENT:   Head: Normocephalic and atraumatic.   Ears:   Right Ear: Hearing, external ear and ear canal normal. A middle ear effusion is present.   Left Ear: Hearing, external ear and ear canal normal. A middle ear effusion is present.   Nose: Rhinorrhea and congestion present. No mucosal edema or nasal deformity. No epistaxis. Right sinus exhibits no maxillary sinus tenderness and no frontal sinus " tenderness. Left sinus exhibits maxillary sinus tenderness. Left sinus exhibits no frontal sinus tenderness.   Mouth/Throat: Uvula is midline, oropharynx is clear and moist and mucous membranes are normal. No trismus in the jaw. Normal dentition. No uvula swelling. No oropharyngeal exudate, posterior oropharyngeal edema or posterior oropharyngeal erythema.   Eyes: Conjunctivae and lids are normal. No scleral icterus.   Neck: Trachea normal and phonation normal. Neck supple. No edema present. No erythema present. No neck rigidity present.   Cardiovascular: Normal rate, regular rhythm, normal heart sounds and normal pulses.   Pulmonary/Chest: Effort normal and breath sounds normal. No stridor. No respiratory distress. She has no decreased breath sounds. She has no wheezes. She has no rhonchi. She has no rales. She exhibits no tenderness.   Abdominal: Normal appearance.   Musculoskeletal: Normal range of motion.         General: No deformity. Normal range of motion.   Neurological: She is alert and oriented to person, place, and time. She exhibits normal muscle tone. Coordination normal.   Skin: Skin is warm, dry, intact, not diaphoretic and not pale.   Psychiatric: Her speech is normal and behavior is normal. Judgment and thought content normal.   Nursing note and vitals reviewed.      Assessment:     1. Bacterial sinusitis    2. Cough, unspecified type        Plan:       Bacterial sinusitis  -     amoxicillin-clavulanate 875-125mg (AUGMENTIN) 875-125 mg per tablet; Take 1 tablet by mouth every 12 (twelve) hours. for 7 days  Dispense: 14 tablet; Refill: 0    Cough, unspecified type  -     SARS Coronavirus 2 Antigen, POCT Manual Read  -     XR CHEST PA AND LATERAL; Future; Expected date: 08/16/2024  -     promethazine-dextromethorphan (PROMETHAZINE-DM) 6.25-15 mg/5 mL Syrp; Take 5 mLs by mouth every 6 (six) hours as needed (cough).  Dispense: 118 mL; Refill: 0        Results for orders placed or performed in visit on  08/16/24   SARS Coronavirus 2 Antigen, POCT Manual Read   Result Value Ref Range    SARS Coronavirus 2 Antigen Negative Negative     Acceptable Yes        XR CHEST PA AND LATERAL    Result Date: 8/16/2024  EXAMINATION: XR CHEST PA AND LATERAL CLINICAL HISTORY: Cough, unspecified TECHNIQUE: PA and lateral views of the chest were performed. COMPARISON: No 03/11/2024 ne FINDINGS: Heart size normal.  The lungs are clear.  No pleural effusion     See above Electronically signed by: Tom Carvajal MD Date:    08/16/2024 Time:    10:04        Patient Instructions   Take all of the antibiotic as prescribed. Use cough suppressant as needed especially at night time if you are having trouble sleeping. If you should develop increased shortness of breath, chest pain or increased weakness please return or go the ER for evaluation. Repeat chest xrays can be performed to see if there is resolution of the infection however this can take 8-12 weeks to have complete resolution. Follow up in 2-3 days for re-evaluation of treatment.

## 2024-08-16 NOTE — PATIENT INSTRUCTIONS
Take all of the antibiotic as prescribed. Use cough suppressant as needed especially at night time if you are having trouble sleeping. If you should develop increased shortness of breath, chest pain or increased weakness please return or go the ER for evaluation. Repeat chest xrays can be performed to see if there is resolution of the infection however this can take 8-12 weeks to have complete resolution. Follow up in 2-3 days for re-evaluation of treatment.

## 2024-08-28 ENCOUNTER — PATIENT MESSAGE (OUTPATIENT)
Dept: DIABETES | Facility: CLINIC | Age: 49
End: 2024-08-28
Payer: COMMERCIAL

## 2024-09-05 ENCOUNTER — PATIENT MESSAGE (OUTPATIENT)
Dept: OBSTETRICS AND GYNECOLOGY | Facility: CLINIC | Age: 49
End: 2024-09-05
Payer: COMMERCIAL

## 2024-09-18 ENCOUNTER — OFFICE VISIT (OUTPATIENT)
Dept: OBSTETRICS AND GYNECOLOGY | Facility: CLINIC | Age: 49
End: 2024-09-18
Payer: COMMERCIAL

## 2024-09-18 ENCOUNTER — TELEPHONE (OUTPATIENT)
Dept: PHARMACY | Facility: CLINIC | Age: 49
End: 2024-09-18
Payer: COMMERCIAL

## 2024-09-18 ENCOUNTER — PATIENT MESSAGE (OUTPATIENT)
Dept: OBSTETRICS AND GYNECOLOGY | Facility: CLINIC | Age: 49
End: 2024-09-18

## 2024-09-18 VITALS
WEIGHT: 137.38 LBS | SYSTOLIC BLOOD PRESSURE: 144 MMHG | HEIGHT: 62 IN | BODY MASS INDEX: 25.28 KG/M2 | DIASTOLIC BLOOD PRESSURE: 92 MMHG

## 2024-09-18 DIAGNOSIS — N93.9 ABNORMAL UTERINE BLEEDING (AUB): ICD-10-CM

## 2024-09-18 DIAGNOSIS — N95.1 PERIMENOPAUSAL: ICD-10-CM

## 2024-09-18 DIAGNOSIS — N92.1 BREAKTHROUGH BLEEDING ON OCPS: Primary | ICD-10-CM

## 2024-09-18 DIAGNOSIS — I10 PRIMARY HYPERTENSION: ICD-10-CM

## 2024-09-18 DIAGNOSIS — E11.9 TYPE 2 DIABETES MELLITUS WITHOUT COMPLICATION, WITHOUT LONG-TERM CURRENT USE OF INSULIN: ICD-10-CM

## 2024-09-18 LAB
B-HCG UR QL: NEGATIVE
CTP QC/QA: YES

## 2024-09-18 PROCEDURE — 1159F MED LIST DOCD IN RCRD: CPT | Mod: CPTII,S$GLB,, | Performed by: OBSTETRICS & GYNECOLOGY

## 2024-09-18 PROCEDURE — 87591 N.GONORRHOEAE DNA AMP PROB: CPT | Performed by: OBSTETRICS & GYNECOLOGY

## 2024-09-18 PROCEDURE — 81025 URINE PREGNANCY TEST: CPT | Mod: S$GLB,,, | Performed by: OBSTETRICS & GYNECOLOGY

## 2024-09-18 PROCEDURE — 99214 OFFICE O/P EST MOD 30 MIN: CPT | Mod: S$GLB,,, | Performed by: OBSTETRICS & GYNECOLOGY

## 2024-09-18 PROCEDURE — 3077F SYST BP >= 140 MM HG: CPT | Mod: CPTII,S$GLB,, | Performed by: OBSTETRICS & GYNECOLOGY

## 2024-09-18 PROCEDURE — 99999 PR PBB SHADOW E&M-EST. PATIENT-LVL III: CPT | Mod: PBBFAC,,, | Performed by: OBSTETRICS & GYNECOLOGY

## 2024-09-18 PROCEDURE — 3061F NEG MICROALBUMINURIA REV: CPT | Mod: CPTII,S$GLB,, | Performed by: OBSTETRICS & GYNECOLOGY

## 2024-09-18 PROCEDURE — 4010F ACE/ARB THERAPY RXD/TAKEN: CPT | Mod: CPTII,S$GLB,, | Performed by: OBSTETRICS & GYNECOLOGY

## 2024-09-18 PROCEDURE — 1160F RVW MEDS BY RX/DR IN RCRD: CPT | Mod: CPTII,S$GLB,, | Performed by: OBSTETRICS & GYNECOLOGY

## 2024-09-18 PROCEDURE — 3066F NEPHROPATHY DOC TX: CPT | Mod: CPTII,S$GLB,, | Performed by: OBSTETRICS & GYNECOLOGY

## 2024-09-18 PROCEDURE — 3080F DIAST BP >= 90 MM HG: CPT | Mod: CPTII,S$GLB,, | Performed by: OBSTETRICS & GYNECOLOGY

## 2024-09-18 PROCEDURE — 87491 CHLMYD TRACH DNA AMP PROBE: CPT | Performed by: OBSTETRICS & GYNECOLOGY

## 2024-09-18 PROCEDURE — 3008F BODY MASS INDEX DOCD: CPT | Mod: CPTII,S$GLB,, | Performed by: OBSTETRICS & GYNECOLOGY

## 2024-09-18 PROCEDURE — 3044F HG A1C LEVEL LT 7.0%: CPT | Mod: CPTII,S$GLB,, | Performed by: OBSTETRICS & GYNECOLOGY

## 2024-09-18 NOTE — TELEPHONE ENCOUNTER
[facility]/Population Health Chart Review & Patient Outreach Details For Medication Adherence Project    Reason for Outreach Encounter: 3rd Party payor non-compliance report ([employer], [employer], [employer], etc)  2.  Patient Outreach Method: Reviewed patient chart   3.   Medication in question: lisinopril   LAST FILLED: n/a        4.  Reviewed and or Updates Made To: Patient Chart  5. Outreach Outcomes and/or actions taken: Medication discontinued    Additional Notes:    Therapy completed

## 2024-09-18 NOTE — PROGRESS NOTES
"Chief Complaint   Patient presents with    irregular bleeding       HPI:  Nahed Mcginnis is a 49 y.o. female patient  who presents today for evaluation of abnormal bleeding.  She is currently on Slynd and generally experiences regular monthly withdrawal bleeding lasting 4 days in duration, without intermenstrual bleeding.  Her LMP began 5 days earlier than expected on 24 and lasted until yesterday.  Today, she denies seeing any bleeding.  No pelvic pain.  No fever.  Prior to the onset of this prolonged bleeding, she describes being ill and taking Augmentin.  Otherwise, denies any recent changes in medical history or medications.  Reports that her sugars have been well controlled and she is no longer on antihypertensive medications.  Patient's last menstrual period was 2024 (exact date).    Blood pressure (!) 144/92, height 5' 2" (1.575 m), weight 62.3 kg (137 lb 5.6 oz), last menstrual period 2024.    UPT today: Negative    22 Pap: Negative, HPV: Negative     Past Medical History:   Diagnosis Date    Diabetes mellitus, type 2     DM (diabetes mellitus)     Hypertension     Hypothyroidism        Past Surgical History:   Procedure Laterality Date     SECTION      COLONOSCOPY      DILATION AND CURETTAGE OF UTERUS      TONSILLECTOMY           ROS:  GENERAL: Feeling well overall.   SKIN: Denies rash or lesions.   HEAD: Denies head injury or headache.   NODES: Denies enlarged lymph nodes.   CHEST: Denies chest pain or shortness of breath.   CARDIOVASCULAR: Denies palpitations or left sided chest pain.   ABDOMEN: No abdominal pain, nausea, vomiting or rectal bleeding.   URINARY: No dysuria or hematuria.  REPRODUCTIVE: See HPI.   BREASTS: Denies pain, lumps, or nipple discharge.   HEMATOLOGIC: Reports recent abnormal bleeding.  MUSCULOSKELETAL: Denies joint pain or swelling.   NEUROLOGIC: Denies syncope or weakness.   PSYCHIATRIC: Denies depression.    PE:   (chaperone present during " entire exam)  APPEARANCE: Well nourished, well developed, in no acute distress.  ABDOMEN: Soft. No tenderness or masses. No hernias. No CVA tenderness.  VULVA: No lesions. Normal female genitalia.  URETHRAL MEATUS: Normal size and location, no lesions, no prolapse.  URETHRA: No masses, tenderness, prolapse or scarring.  VAGINA: Moist and well rugated, no abnormal discharge, no bleeding seen.  CERVIX: No lesions and discharge.     UTERUS: Normal size, regular shape, mobile, non-tender, bladder base nontender.  ADNEXA: No masses, tenderness or CDS nodularity.  ANUS PERINEUM: Normal.    Diagnosis:  1. Breakthrough bleeding on OCPs    2. Abnormal uterine bleeding (AUB)    3. Perimenopausal    4. Primary hypertension    5. Type 2 diabetes mellitus without complication, without long-term current use of insulin          PLAN:    Orders Placed This Encounter    US Pelvis Comp with Transvag NON-OB (xpd    C. trachomatis/N. gonorrhoeae by AMP DNA    POCT URINE PREGNANCY       Patient was counseled today on her recent prolonged breakthrough bleeding on OCPs.  Currently, she is not experiencing any bleeding.  UPT today was negative.  GC/CT was performed to exclude infectious etiology.  She will have a pelvic ultrasound for evaluation of her uterus.  For now, she will continue with Slynd and let us know if abnormal bleeding returns.  She was encouraged to follow-up with her PCP for BP evaluation.    Follow-up after testing.  Return for annual exam    I spent a total of 34 minutes on the day of the visit.This includes face to face time and non-face to face time preparing to see the patient (eg, review of tests), Obtaining and/or reviewing separately obtained history, Documenting clinical information in the electronic or other health record, Independently interpreting resultsand communicating results to the patient/family/caregiver, or Care coordination.    This note was created with voice recognition software.  Grammatical,  syntax and spelling errors may be inevitable.        Answers submitted by the patient for this visit:  Gynecologic Exam Questionnaire  (Submitted on 2024)  Chief Complaint: Gynecologic exam  vaginal bleeding: Yes  Chronicity: recurrent  Onset: 1 to 4 weeks ago  Frequency: daily  Progression since onset: unchanged  Pain severity: no pain  Affected side: both  Pregnant now?: No  abdominal pain: No  anorexia: No  back pain: No  chills: No  constipation: No  diarrhea: No  discolored urine: No  dysuria: No  fever: No  flank pain: No  frequency: No  headaches: No  hematuria: No  nausea: No  painful intercourse: No  rash: No  urgency: No  vomiting: No  Please select the characteristics of your discharge: : normal  Vaginal bleeding: typical of menses  Passing clots?: Yes  Passing tissue?: No  Aggravated by: nothing  treatments tried: acetaminophen, NSAIDs, warm bath  Improvement on treatment: no relief  Sexual activity: not sexually active  Partner with STD symptoms: no  Birth control: oral contraceptives  Menstrual history: irregular  STD: No  abdominal surgery: No   section: Yes  Ectopic pregnancy: No  Endometriosis: Yes  herpes simplex: No  gynecological surgery: No  menorrhagia: Yes  metrorrhagia: No  miscarriage: Yes  ovarian cysts: Yes  perineal abscess: No  PID: No  terminated pregnancy: No  vaginosis: No

## 2024-09-19 ENCOUNTER — PATIENT MESSAGE (OUTPATIENT)
Dept: OBSTETRICS AND GYNECOLOGY | Facility: CLINIC | Age: 49
End: 2024-09-19
Payer: COMMERCIAL

## 2024-09-19 LAB
C TRACH DNA SPEC QL NAA+PROBE: NOT DETECTED
N GONORRHOEA DNA SPEC QL NAA+PROBE: NOT DETECTED

## 2024-09-22 NOTE — TELEPHONE ENCOUNTER
Refill Routing Note   Medication(s) are not appropriate for processing by [facility] for the following reason(s):        No active prescription written by provider    [facility] action(s):  Defer             Appointments  past 12m or future 3m with PCP    Date Provider   Last Visit   5/6/2024 Fausto Hammonds MD   Next Visit   Visit date not found Fausto Hammonds MD   ED visits in past 90 days: 0        Note composed:10:06 AM 09/22/2024

## 2024-09-22 NOTE — TELEPHONE ENCOUNTER
No care due was identified.  [facility] Embedded Care Due Messages. Reference number: [id number].   9/22/2024 6:13:39 AM CDT

## 2024-09-23 RX ORDER — ATORVASTATIN CALCIUM 40 MG/1
40 TABLET, FILM COATED ORAL
Qty: 90 TABLET | Refills: 0 | Status: SHIPPED | OUTPATIENT
Start: 2024-09-23

## 2024-09-25 ENCOUNTER — PATIENT MESSAGE (OUTPATIENT)
Dept: DIABETES | Facility: CLINIC | Age: 49
End: 2024-09-25
Payer: COMMERCIAL

## 2024-09-25 ENCOUNTER — TELEPHONE (OUTPATIENT)
Dept: OBSTETRICS AND GYNECOLOGY | Facility: CLINIC | Age: 49
End: 2024-09-25
Payer: COMMERCIAL

## 2024-09-25 ENCOUNTER — PATIENT MESSAGE (OUTPATIENT)
Dept: INTERNAL MEDICINE | Facility: CLINIC | Age: 49
End: 2024-09-25
Payer: COMMERCIAL

## 2024-09-25 NOTE — TELEPHONE ENCOUNTER
Called patient:    Discussed results of pelvic sono:    FINDINGS:  Uterus:  Size: 10 x 6 x 6 cm  Masses: 1.2 x 0.8 x 1 cm submucosal fibroid.  2.7 x 2.4 x 2.7 cm intramural fibroid at the superior aspect of the fundus.   scar at the anterior lower uterine segment.  Endometrium: Normal in thickness in this premenopausal patient measuring approximate 12 mm noting somewhat limited evaluation secondary to orientation.  Right ovary:  Size: 2 x 2 x 2 cm  Appearance: Normal  Vascular flow: Normal.  Left ovary:  Size: 4 x 2 x 3 cm  Appearance: Normal  Vascular Flow: Normal.  Free Fluid:  None.  Impression:  Uterine fibroids as detailed above.    We discussed her uterine fibroids and management options.  She is currently on Slynd and has not had any addition abnormal bleeding.  She will continue Slynd and monitor.  For additional abnormal bleeding, she will need EMBX.  To call us any additional abnormal bleeding.

## 2024-09-28 RX ORDER — ATORVASTATIN CALCIUM 40 MG/1
40 TABLET, FILM COATED ORAL
Qty: 90 TABLET | Refills: 0 | OUTPATIENT
Start: 2024-09-28

## 2024-09-28 NOTE — TELEPHONE ENCOUNTER
No care due was identified.  [facility] Embedded Care Due Messages. Reference number: [id number].   9/28/2024 8:02:02 AM CDT

## 2024-09-28 NOTE — TELEPHONE ENCOUNTER
Quick DC. Inappropriate Request    Refill Authorization Note   Nahed Mcginnis  is requesting a refill authorization.  Brief Assessment and Rationale for Refill:  Quick Discontinue  Medication Therapy Plan:  E-Prescribing Status: Receipt confirmed by pharmacy (9/23/2024  8:58 AM CDT    Medication Reconciliation Completed:  No      Comments:     Note composed:5:29 PM 09/28/2024

## 2024-10-03 ENCOUNTER — PATIENT MESSAGE (OUTPATIENT)
Dept: DIABETES | Facility: CLINIC | Age: 49
End: 2024-10-03
Payer: COMMERCIAL

## 2024-10-08 ENCOUNTER — PATIENT OUTREACH (OUTPATIENT)
Dept: ADMINISTRATIVE | Facility: HOSPITAL | Age: 49
End: 2024-10-08
Payer: COMMERCIAL

## 2024-10-10 ENCOUNTER — PATIENT MESSAGE (OUTPATIENT)
Dept: INTERNAL MEDICINE | Facility: CLINIC | Age: 49
End: 2024-10-10
Payer: COMMERCIAL

## 2024-10-15 ENCOUNTER — PATIENT MESSAGE (OUTPATIENT)
Dept: DIABETES | Facility: CLINIC | Age: 49
End: 2024-10-15
Payer: COMMERCIAL

## 2024-10-16 ENCOUNTER — PATIENT MESSAGE (OUTPATIENT)
Dept: ADMINISTRATIVE | Facility: HOSPITAL | Age: 49
End: 2024-10-16
Payer: COMMERCIAL

## 2024-10-16 ENCOUNTER — PATIENT OUTREACH (OUTPATIENT)
Dept: INTERNAL MEDICINE | Facility: CLINIC | Age: 49
End: 2024-10-16
Payer: COMMERCIAL

## 2024-10-18 ENCOUNTER — TELEPHONE (OUTPATIENT)
Dept: ENDOSCOPY | Facility: HOSPITAL | Age: 49
End: 2024-10-18
Payer: COMMERCIAL

## 2024-10-18 NOTE — TELEPHONE ENCOUNTER
Spoke to pt for pre-call to confirm scheduled Colonoscopy and patient verbalized understanding of the following:       Date of Procedure (s)  verified 10/25/24  Arrival Time 9:45 AM verified.  Location of Procedure(s) Reading 4th Floor verified.  NPO status reinforced. Ok to continue clear liquids up until 2 hours prior to the Endoscopy procedure.   Confirmed Pt is currently taking Ozempic (Semaglutide), Pt instructed to stop stop taking Ozempic (Semaglutide) on 10/17/24    Last dose 10/12/24.     Pt confirmed receipt of prep instructions and Rx prep (if applicable).  Instructions provided to patient via [facility]  Pt confirmed ride home after procedure if procedure requires anesthesia.   Pre-call screening questionnaire reviewed and completed with patient.   Appointment details are tentative, including check-in time.  If the patient begins taking any blood thinning medications, injectable weight loss/diabetes medications (other than insulin), or Adipex (phentermine) patient was instructed to contact the endoscopy scheduling department as soon as possible.  Patient was advised to call the endoscopy scheduling department if any questions or concerns arise.       [facility] Scheduling Department

## 2024-10-25 ENCOUNTER — ANESTHESIA EVENT (OUTPATIENT)
Dept: ENDOSCOPY | Facility: HOSPITAL | Age: 49
End: 2024-10-25
Payer: COMMERCIAL

## 2024-10-25 ENCOUNTER — ANESTHESIA (OUTPATIENT)
Dept: ENDOSCOPY | Facility: HOSPITAL | Age: 49
End: 2024-10-25
Payer: COMMERCIAL

## 2024-10-25 ENCOUNTER — HOSPITAL ENCOUNTER (OUTPATIENT)
Facility: HOSPITAL | Age: 49
Discharge: HOME | End: 2024-10-25
Attending: COLON & RECTAL SURGERY | Admitting: COLON & RECTAL SURGERY
Payer: COMMERCIAL

## 2024-10-25 VITALS
RESPIRATION RATE: 16 BRPM | BODY MASS INDEX: 25.68 KG/M2 | WEIGHT: 136 LBS | OXYGEN SATURATION: 100 % | SYSTOLIC BLOOD PRESSURE: 129 MMHG | TEMPERATURE: 98 F | HEART RATE: 81 BPM | HEIGHT: 61 IN | DIASTOLIC BLOOD PRESSURE: 82 MMHG

## 2024-10-25 DIAGNOSIS — Z12.11 COLON CANCER SCREENING: ICD-10-CM

## 2024-10-25 LAB
B-HCG UR QL: NEGATIVE
CTP QC/QA: YES
GLUCOSE SERPL-MCNC: 71 MG/DL (ref 70–110)
POCT GLUCOSE: 71 MG/DL (ref 70–110)

## 2024-10-25 PROCEDURE — 37000009 HC ANESTHESIA EA ADD 15 MINS: Performed by: COLON & RECTAL SURGERY

## 2024-10-25 PROCEDURE — G0121 COLON CA SCRN NOT HI RSK IND: HCPCS | Performed by: COLON & RECTAL SURGERY

## 2024-10-25 PROCEDURE — G0121 COLON CA SCRN NOT HI RSK IND: HCPCS | Mod: ,,, | Performed by: COLON & RECTAL SURGERY

## 2024-10-25 PROCEDURE — 63600175 PHARM REV CODE 636 W HCPCS: Performed by: NURSE ANESTHETIST, CERTIFIED REGISTERED

## 2024-10-25 PROCEDURE — 81025 URINE PREGNANCY TEST: CPT | Performed by: COLON & RECTAL SURGERY

## 2024-10-25 PROCEDURE — E9220 PRA ENDO ANESTHESIA: HCPCS | Mod: ,,, | Performed by: NURSE ANESTHETIST, CERTIFIED REGISTERED

## 2024-10-25 PROCEDURE — 37000008 HC ANESTHESIA 1ST 15 MINUTES: Performed by: COLON & RECTAL SURGERY

## 2024-10-25 RX ORDER — PROPOFOL 10 MG/ML
VIAL (ML) INTRAVENOUS
Status: DISCONTINUED | OUTPATIENT
Start: 2024-10-25 | End: 2024-10-25

## 2024-10-25 RX ORDER — SODIUM CHLORIDE 9 MG/ML
INJECTION, SOLUTION INTRAVENOUS CONTINUOUS
Status: DISCONTINUED | OUTPATIENT
Start: 2024-10-25 | End: 2024-10-25 | Stop reason: HOSPADM

## 2024-10-25 RX ADMIN — PROPOFOL 50 MG: 10 INJECTION, EMULSION INTRAVENOUS at 11:10

## 2024-10-25 RX ADMIN — PROPOFOL 175 MCG/KG/MIN: 10 INJECTION, EMULSION INTRAVENOUS at 11:10

## 2024-10-28 ENCOUNTER — PATIENT MESSAGE (OUTPATIENT)
Dept: PRIMARY CARE CLINIC | Facility: CLINIC | Age: 49
End: 2024-10-28
Payer: COMMERCIAL

## 2024-11-15 VITALS — DIASTOLIC BLOOD PRESSURE: 86 MMHG | SYSTOLIC BLOOD PRESSURE: 118 MMHG

## 2024-12-06 ENCOUNTER — PATIENT MESSAGE (OUTPATIENT)
Dept: OBSTETRICS AND GYNECOLOGY | Facility: CLINIC | Age: 49
End: 2024-12-06
Payer: COMMERCIAL

## 2024-12-08 NOTE — PROGRESS NOTES
Chief Complaint   Patient presents with    Well Woman       HPI:  Nahed Mcginnis is a 49 y.o. female patient  who presents today for annual exam.  She is currently on Slynd progesterone only pills, generally with regular monthly menses, lasting 3-5 days in duration.  She had been seen 2024 for an episode of breakthrough bleeding which lasted for about 3 weeks in duration.  Since then, she has not had any additional abnormal bleeding and describes having a normal period 10/2024 and 2024.  Workup at that time revealed a 12 mm endometrial stripe and two fibroids (largest 2.7 cm).  Denies recent changes in her medical or surgical history.  Mammogram scheduled 2025.  She has a history of hypertension.  No gyn complaints.    24 Note:  She is currently on Slynd and generally experiences regular monthly withdrawal bleeding lasting 4 days in duration, without intermenstrual bleeding.  Her LMP began 5 days earlier than expected on 24 and lasted until yesterday.  Today, she denies seeing any bleeding.  No pelvic pain.  No fever.  Prior to the onset of this prolonged bleeding, she describes being ill and taking Augmentin.  Otherwise, denies any recent changes in medical history or medications.  Reports that her sugars have been well controlled and she is no longer on antihypertensive medications.  Patient was counseled today on her recent prolonged breakthrough bleeding on OCPs.  Currently, she is not experiencing any bleeding.  UPT today was negative.  GC/CT was performed to exclude infectious etiology.  She will have a pelvic ultrasound for evaluation of her uterus.  For now, she will continue with Slynd and let us know if abnormal bleeding returns.  She was encouraged to follow-up with her PCP for BP evaluation.     24 Phone Note:  We discussed her uterine fibroids and management options.  She is currently on Slynd and has not had any addition abnormal bleeding.  She will continue Slynd  "and monitor.  For additional abnormal bleeding, she will need EMBX.  To call us any additional abnormal bleeding.     Blood pressure (!) 132/90, height 5' 1" (1.549 m), weight 62.4 kg (137 lb 9.1 oz), last menstrual period 2024.      22 Pap: Negative, HPV: Negative     23 Mammogram: Negative, TC 5.39%    24 Pelvic sono:  FINDINGS:  Uterus:  Size: 10 x 6 x 6 cm  Masses: 1.2 x 0.8 x 1 cm submucosal fibroid.  2.7 x 2.4 x 2.7 cm intramural fibroid at the superior aspect of the fundus.   scar at the anterior lower uterine segment.  Endometrium: Normal in thickness in this premenopausal patient measuring approximate 12 mm noting somewhat limited evaluation secondary to orientation.  Right ovary:  Size: 2 x 2 x 2 cm  Appearance: Normal  Vascular flow: Normal.  Left ovary:  Size: 4 x 2 x 3 cm  Appearance: Normal  Vascular Flow: Normal.  Free Fluid:  None.  Impression:  Uterine fibroids as detailed above.      Past Medical History:   Diagnosis Date    Abnormal Pap smear of cervix     Diabetes mellitus, type 2     DM (diabetes mellitus)     Hypertension     Hypothyroidism        Past Surgical History:   Procedure Laterality Date     SECTION      COLONOSCOPY      COLONOSCOPY N/A 10/25/2024    Procedure: COLONOSCOPY;  Surgeon: Paola Clements MD;  Location: [address];  Service: Endoscopy;  Laterality: N/A;  [facility]  10/18-pre call complete-last ozempic 10/12/24-tb    DILATION AND CURETTAGE OF UTERUS      TONSILLECTOMY           ROS:  GENERAL: Feeling well overall.   SKIN: Denies rash or lesions.   HEAD: Denies head injury or headache.   NODES: Denies enlarged lymph nodes.   CHEST: Denies chest pain or shortness of breath.   CARDIOVASCULAR: Denies palpitations or left sided chest pain.   ABDOMEN: No abdominal pain, nausea, vomiting or rectal bleeding.   URINARY: No dysuria or hematuria.  REPRODUCTIVE: See HPI.   BREASTS: Denies " pain, lumps, or nipple discharge.   HEMATOLOGIC:  Describes breakthrough bleeding 09/2024 with subsequent normal periods.    MUSCULOSKELETAL: Denies joint pain or swelling.   NEUROLOGIC: Denies syncope or weakness.   PSYCHIATRIC: Denies depression.    PE:   (chaperone present during entire exam)  APPEARANCE: Well nourished, well developed, in no acute distress.  ABDOMEN: Soft. No tenderness or masses.   VULVA: No lesions. Normal female genitalia.  URETHRAL MEATUS: Normal size and location, no lesions, no prolapse.  URETHRA: No masses, tenderness, prolapse or scarring.  VAGINA:  No lesions, no abnormal discharge, no significant cystocele or rectocele.  CERVIX: No lesions and discharge. Pap performed.  UTERUS: Normal size, regular shape, mobile, non-tender, bladder base nontender.  ADNEXA: No masses, tenderness or CDS nodularity.  ANUS PERINEUM: Normal.    Diagnosis:  1. Women's annual routine gynecological examination    2. Perimenopausal    3. Encounter for surveillance of contraceptive pills    4. Pap smear for cervical cancer screening    5. Primary hypertension    6. Type 2 diabetes mellitus without complication, without long-term current use of insulin          PLAN:    Orders Placed This Encounter    HPV High Risk Genotypes, PCR    Liquid-Based Pap Smear, Screening    drospirenone, contraceptive, (SLYND) 4 mg (28) Tab       Patient was counseled today on her usage of Slynd and expected perimenopausal bleeding patterns.  She understands the need to contact us for any abnormal bleeding.    Mammogram 1/7/25  Follow-up 1 year    This note was created with voice recognition software.  Grammatical, syntax and spelling errors may be inevitable.

## 2024-12-09 ENCOUNTER — OFFICE VISIT (OUTPATIENT)
Dept: OBSTETRICS AND GYNECOLOGY | Facility: CLINIC | Age: 49
End: 2024-12-09
Payer: COMMERCIAL

## 2024-12-09 VITALS
BODY MASS INDEX: 25.97 KG/M2 | DIASTOLIC BLOOD PRESSURE: 90 MMHG | WEIGHT: 137.56 LBS | SYSTOLIC BLOOD PRESSURE: 132 MMHG | HEIGHT: 61 IN

## 2024-12-09 DIAGNOSIS — Z12.4 PAP SMEAR FOR CERVICAL CANCER SCREENING: ICD-10-CM

## 2024-12-09 DIAGNOSIS — Z01.419 WOMEN'S ANNUAL ROUTINE GYNECOLOGICAL EXAMINATION: Primary | ICD-10-CM

## 2024-12-09 DIAGNOSIS — I10 PRIMARY HYPERTENSION: ICD-10-CM

## 2024-12-09 DIAGNOSIS — E11.9 TYPE 2 DIABETES MELLITUS WITHOUT COMPLICATION, WITHOUT LONG-TERM CURRENT USE OF INSULIN: ICD-10-CM

## 2024-12-09 DIAGNOSIS — Z30.41 ENCOUNTER FOR SURVEILLANCE OF CONTRACEPTIVE PILLS: ICD-10-CM

## 2024-12-09 DIAGNOSIS — N95.1 PERIMENOPAUSAL: ICD-10-CM

## 2024-12-09 PROCEDURE — 88175 CYTOPATH C/V AUTO FLUID REDO: CPT | Performed by: OBSTETRICS & GYNECOLOGY

## 2024-12-09 PROCEDURE — 99999 PR PBB SHADOW E&M-EST. PATIENT-LVL III: CPT | Mod: PBBFAC,,, | Performed by: OBSTETRICS & GYNECOLOGY

## 2024-12-09 RX ORDER — DROSPIRENONE 4 MG/1
4 TABLET, FILM COATED ORAL DAILY
Qty: 84 TABLET | Refills: 3 | Status: SHIPPED | OUTPATIENT
Start: 2024-12-09

## 2024-12-13 ENCOUNTER — PATIENT MESSAGE (OUTPATIENT)
Dept: OBSTETRICS AND GYNECOLOGY | Facility: CLINIC | Age: 49
End: 2024-12-13
Payer: COMMERCIAL

## 2024-12-13 LAB
FINAL PATHOLOGIC DIAGNOSIS: NORMAL
Lab: NORMAL

## 2024-12-26 DIAGNOSIS — E11.9 TYPE 2 DIABETES MELLITUS WITHOUT COMPLICATION, WITHOUT LONG-TERM CURRENT USE OF INSULIN: ICD-10-CM

## 2024-12-26 NOTE — TELEPHONE ENCOUNTER
Refill Routing Note   Medication(s) are not appropriate for processing by [facility] for the following reason(s):        Due for refill >6 months ago: Test Strips  No active prescription written by provider: Lipitor    [facility] action(s):  Defer        Medication Therapy Plan: Last ordered: 3 months ago (9/23/2024) by Ernie Gaona MD      Appointments  past 12m or future 3m with PCP    Date Provider   Last Visit   5/6/2024 Fausto Hammonds MD   Next Visit   Visit date not found Fausto Hammonds MD   ED visits in past 90 days: 0        Note composed:3:57 PM 12/26/2024

## 2024-12-26 NOTE — TELEPHONE ENCOUNTER
No care due was identified.  [facility] Embedded Care Due Messages. Reference number: [id number].   12/26/2024 7:46:31 AM CST

## 2024-12-27 RX ORDER — CALCIUM CITRATE/VITAMIN D3 200MG-6.25
TABLET ORAL
Qty: 200 EACH | Refills: 2 | Status: SHIPPED | OUTPATIENT
Start: 2024-12-27

## 2024-12-27 RX ORDER — ATORVASTATIN CALCIUM 40 MG/1
40 TABLET, FILM COATED ORAL DAILY
Qty: 90 TABLET | Refills: 1 | Status: SHIPPED | OUTPATIENT
Start: 2024-12-27

## 2025-01-07 ENCOUNTER — HOSPITAL ENCOUNTER (OUTPATIENT)
Dept: RADIOLOGY | Facility: HOSPITAL | Age: 50
Discharge: HOME | End: 2025-01-07
Attending: OBSTETRICS & GYNECOLOGY
Payer: COMMERCIAL

## 2025-01-07 VITALS — WEIGHT: 132 LBS | BODY MASS INDEX: 24.94 KG/M2

## 2025-01-07 DIAGNOSIS — Z12.31 VISIT FOR SCREENING MAMMOGRAM: ICD-10-CM

## 2025-01-07 PROCEDURE — 77067 SCR MAMMO BI INCL CAD: CPT | Mod: 26,,, | Performed by: RADIOLOGY

## 2025-01-07 PROCEDURE — 77063 BREAST TOMOSYNTHESIS BI: CPT | Mod: 26,,, | Performed by: RADIOLOGY

## 2025-01-07 PROCEDURE — 77067 SCR MAMMO BI INCL CAD: CPT | Mod: TC,PO

## 2025-01-08 ENCOUNTER — PATIENT MESSAGE (OUTPATIENT)
Dept: OBSTETRICS AND GYNECOLOGY | Facility: CLINIC | Age: 50
End: 2025-01-08
Payer: COMMERCIAL

## 2025-01-10 ENCOUNTER — TELEPHONE (OUTPATIENT)
Dept: PHARMACY | Facility: CLINIC | Age: 50
End: 2025-01-10
Payer: COMMERCIAL

## 2025-01-11 NOTE — TELEPHONE ENCOUNTER
[facility]/Population Health Chart Review & Patient Outreach Details For Medication Adherence Project    Reason for Outreach Encounter: 3rd Party payor non-compliance report ([employer], [employer], C, etc)  2.  Patient Outreach Method: Reviewed Patient Chart  3.   Medication in question: atorvastatin    LAST FILLED: 12/27/24 for 90 day supply  Hyperlipidemia Medications               atorvastatin (LIPITOR) 40 MG tablet Take 1 tablet (40 mg total) by mouth once daily.               4.  Reviewed and or Updates Made To: Patient Chart  5. Outreach Outcomes and/or actions taken: Patient filled medication and is on track to be adherent

## 2025-01-28 ENCOUNTER — PATIENT MESSAGE (OUTPATIENT)
Dept: ADMINISTRATIVE | Facility: HOSPITAL | Age: 50
End: 2025-01-28
Payer: COMMERCIAL

## 2025-01-30 ENCOUNTER — PATIENT MESSAGE (OUTPATIENT)
Dept: INTERNAL MEDICINE | Facility: CLINIC | Age: 50
End: 2025-01-30
Payer: COMMERCIAL

## 2025-01-30 NOTE — TELEPHONE ENCOUNTER
I spoke to pt, she was offered an appt with one of our providers to discuss iif restarting medication for BP is ok. Pt states she will take Lisinopril and will discuss this in more details at her appt with Dr. Hammonds on 3-7-25

## 2025-02-25 ENCOUNTER — TELEPHONE (OUTPATIENT)
Dept: INTERNAL MEDICINE | Facility: CLINIC | Age: 50
End: 2025-02-25
Payer: COMMERCIAL

## 2025-02-25 NOTE — TELEPHONE ENCOUNTER
----- Message from Ashkan sent at 2/25/2025  3:39 PM CST -----  Contact: [phone]  Caller is requesting an earlier appointment then we can schedule.  Caller is requesting a message be sent to the provider.If this is for urgent care symptoms, did you offer other providers at this location, providers at other locations, or [facility]? (yes, no, n/a):  n/aIf this is for the patients physical, did you offer to schedule next available and put on wait list, or to see NP or PA for their physical?  (yes, no, n/a):  n/aWhen is the next available appointment with their provider:  March 7 2025Reason for the appointment:  Annual visitPatient preference of timeframe to be scheduled: March 26 2025 9:00 am Would the patient like a call back, or a response through their [facility] portal?:   Call back Comments:

## 2025-02-27 ENCOUNTER — TELEPHONE (OUTPATIENT)
Dept: INTERNAL MEDICINE | Facility: CLINIC | Age: 50
End: 2025-02-27
Payer: COMMERCIAL

## 2025-02-27 NOTE — TELEPHONE ENCOUNTER
----- Message from Lita sent at 2/27/2025  9:23 AM CST -----  Contact: [phone]  Pt states she was offered 326/2025 when she called and scheduled but took 3/7/2025. Now she wants 3/26/2025 but someone has taken that appt. I did inform her of this but she would like the nurse to call her and schedule her for 3/26/25. Please call pt to discuss. Thanks

## 2025-03-17 ENCOUNTER — PATIENT MESSAGE (OUTPATIENT)
Dept: INTERNAL MEDICINE | Facility: CLINIC | Age: 50
End: 2025-03-17
Payer: COMMERCIAL

## 2025-03-17 NOTE — PROGRESS NOTES
Subjective:    The following note was written in combination with deep-scribe software and dictation (to which understanding and consent was verbally expressed); please excuse any transcription and/or dictation errors.      Chief Complaint: Annual Exam (fasting), Blood Pressure Check, and Cough (Night x 1 month)    HPI  Ms. Nahed Mcginnis is a 49-year-old woman with extremely well controlled type 2 diabetes (Ozempic; following with endocrinology), hypertension (formally maintained on metoprolol succinate 25, lisinopril 10, and 1/2 of spironolactone 25; no longer believed to requiring treatment following significant weight loss), and hypothyroidism 50 (levothyroxine 50) presenting for annual physical:     Hypertension:   -formally maintained on metoprolol succinate 25, lisinopril 10, and 1/2 of spironolactone 25; all discontinued following significant weight loss (on Ozempic)   -hypertensive on rooming (148/100), wishes consistent with last 7 months of objective/in office blood pressures and correlates subjectively with home readings; heart rate averaging in the upper 80s/low 90s    Cough:   -reporting one-month of nonproductive coughing (exacerbated by lying flat night)  - 1 ill contact (sone with same symptoms preceding her illness)    Family, social, surgical Hx reviewed     Health Maintenance:  Due for annual screening labs routine vaccinations, and scheduling of diabetic foot/eye exams    Past Medical History:   Diagnosis Date    Abnormal Pap smear of cervix     Diabetes mellitus, type 2     DM (diabetes mellitus)     Hypertension     Hypothyroidism      Past Surgical History:   Procedure Laterality Date     SECTION      COLONOSCOPY      COLONOSCOPY N/A 10/25/2024    Procedure: COLONOSCOPY;  Surgeon: Paola Clements MD;  Location: [address];  Service: Endoscopy;  Laterality: N/A;  annamarie-portal-suprep-ozempic  10/18-portal [facility]-  10/18-pre call complete-last ozempic 10/12/24-tb     DILATION AND CURETTAGE OF UTERUS      TONSILLECTOMY       Family History   Problem Relation Name Age of Onset    Heart disease Father      Hypertension Mother      Breast cancer Neg Hx      Colon cancer Neg Hx      Ovarian cancer Neg Hx      Uterine cancer Neg Hx      Cervical cancer Neg Hx       Social History[1]  Review of patient's allergies indicates:  No Known Allergies  Nahed Mcginnis had no medications administered during this visit.   Review of Systems   Constitutional:  Negative for appetite change, chills and fever.   HENT: Negative.     Respiratory:  Positive for cough. Negative for chest tightness and shortness of breath.    Cardiovascular:  Negative for chest pain, palpitations and leg swelling.   Gastrointestinal:  Negative for abdominal distention, abdominal pain, blood in stool, constipation, diarrhea, nausea and vomiting.   Endocrine: Negative.    Genitourinary:  Negative for difficulty urinating, dysuria, frequency and hematuria.   Musculoskeletal: Negative.    Integumentary:  Negative.   Neurological: Negative.    Psychiatric/Behavioral: Negative.           Objective:      Vitals:    03/18/25 0831   BP: (!) 148/100   Pulse: 87   Resp: 18   Temp: 98.2 °F (36.8 °C)      Physical Exam  Vitals reviewed.   Constitutional:       General: She is not in acute distress.     Appearance: Normal appearance.   HENT:      Head: Normocephalic and atraumatic.      Comments: Facial features are symmetric      Nose: Nose normal. No congestion or rhinorrhea.      Mouth/Throat:      Mouth: Mucous membranes are moist.      Pharynx: Oropharynx is clear. No oropharyngeal exudate or posterior oropharyngeal erythema.   Eyes:      General: No scleral icterus.     Extraocular Movements: Extraocular movements intact.      Conjunctiva/sclera: Conjunctivae normal.   Cardiovascular:      Rate and Rhythm: Normal rate and regular rhythm.      Pulses: Normal pulses.      Heart sounds: Normal heart sounds.   Pulmonary:       Effort: Pulmonary effort is normal. No respiratory distress.      Breath sounds: Normal breath sounds.   Musculoskeletal:         General: No deformity or signs of injury. Normal range of motion.      Cervical back: Normal range of motion.   Skin:     General: Skin is warm and dry.      Findings: No rash.   Neurological:      General: No focal deficit present.      Mental Status: She is alert and oriented to person, place, and time. Mental status is at baseline.   Psychiatric:         Mood and Affect: Mood normal.         Behavior: Behavior normal.         Thought Content: Thought content normal.       Medications Ordered Prior to Encounter[2]      Assessment:       1. Physical exam, annual    2. Type 2 diabetes mellitus without complication, without long-term current use of insulin    3. Subacute cough    4. Hypertension, unspecified type        Plan:       Physical exam, annual  -     CBC Auto Differential; Future; Expected date: 03/18/2025  -     Comprehensive Metabolic Panel; Future; Expected date: 03/18/2025  -     Hemoglobin A1C; Future; Expected date: 03/18/2025  -     Lipid Panel; Future; Expected date: 03/18/2025  -     Microalbumin/Creatinine Ratio, Urine; Future; Expected date: 03/18/2025  -     TSH; Future; Expected date: 03/18/2025  -     Vitamin D; Future; Expected date: 03/18/2025   - annual screening labs ordered    - vaccinations deferred     Type 2 diabetes mellitus without complication, without long-term current use of insulin  -     Hemoglobin A1C; Future; Expected date: 03/18/2025  -     Microalbumin/Creatinine Ratio, Urine; Future; Expected date: 03/18/2025   - doing extremely well on Ozempic; reassess A1c and screen for proteinuria    - will continue to follow with endocrinology     Subacute cough   - short course of prednisone provided    Hypertension   - re-initiate succinate 25 for hypertensive/borderline tachycardia and favorable side effect profile; titrate to effect at one-month  follow-up versus re-initiation of previously well-tolerated spironolactone (1/2 of 25 mg tablet) versus lisinopril (if cough has resolved).      Other orders  -     predniSONE (DELTASONE) 20 MG tablet; Take 1 tablet (20 mg total) by mouth 2 (two) times daily. for 4 days  Dispense: 8 tablet; Refill: 0  -     metoprolol succinate (TOPROL-XL) 25 MG 24 hr tablet; Take 1 tablet (25 mg total) by mouth once daily.  Dispense: 90 tablet; Refill: 3                  [1]   Social History  Socioeconomic History    Marital status:    Occupational History    Occupation: clerical     Employer: [employer]   Tobacco Use    Smoking status: Never    Smokeless tobacco: Never   Substance and Sexual Activity    Alcohol use: No    Drug use: No    Sexual activity: Not Currently     Partners: Male     Birth control/protection: OCP     Social Drivers of Health     Financial Resource Strain: Low Risk  (4/29/2024)    Overall Financial Resource Strain (CARDIA)     Difficulty of Paying Living Expenses: Not hard at all   Food Insecurity: No Food Insecurity (4/29/2024)    Hunger Vital Sign     Worried About Running Out of Food in the Last Year: Never true     Ran Out of Food in the Last Year: Never true   Transportation Needs: No Transportation Needs (4/29/2024)    PRAPARE - Transportation     Lack of Transportation (Medical): No     Lack of Transportation (Non-Medical): No   Physical Activity: Insufficiently Active (4/29/2024)    Exercise Vital Sign     Days of Exercise per Week: 4 days     Minutes of Exercise per Session: 30 min   Stress: No Stress Concern Present (4/29/2024)    [employer] [place] [employer] - Occupational Stress Questionnaire     Feeling of Stress : Not at all   Housing Stability: Low Risk  (10/26/2022)    Housing Stability Vital Sign     Unable to Pay for Housing in the Last Year: No     Number of Places Lived in the Last Year: 1     Unstable Housing in the Last Year: No   [2]   Current Outpatient  Medications on File Prior to Visit   Medication Sig Dispense Refill    atorvastatin (LIPITOR) 40 MG tablet Take 1 tablet (40 mg total) by mouth once daily. 90 tablet 1    blood-glucose sensor (FREESTYLE JENNY 3 SENSOR) Trudi 1 Device by Misc.(Non-Drug; Combo Route) route every 14 (fourteen) days. 2 each 11    FEROSUL 325 mg (65 mg iron) Tab tablet TAKE ONE TABLET BY MOUTH DAILY WITH BREAKFAST 90 tablet 1    levothyroxine (SYNTHROID) 50 MCG tablet Take 1 tablet (50 mcg total) by mouth before breakfast. 90 tablet 3    omeprazole (PRILOSEC) 20 MG capsule Take 1 capsule (20 mg total) by mouth once daily. 90 capsule 3    semaglutide (OZEMPIC) 2 mg/dose (8 mg/3 mL) PnIj Inject 2 mg into the skin every 7 days. 3 mL 11    TRUE METRIX GLUCOSE TEST STRIP Strp Pt test her blood sugars 4 times a day 200 each 2    drospirenone, contraceptive, (SLYND) 4 mg (28) Tab Take 1 tablet (4 mg total) by mouth Daily. 84 tablet 3    ondansetron (ZOFRAN) 4 MG tablet Take 1 tablet (4 mg total) by mouth every 6 (six) hours. (Patient not taking: Reported on 3/18/2025) 12 tablet 0     No current facility-administered medications on file prior to visit.

## 2025-03-18 ENCOUNTER — RESULTS FOLLOW-UP (OUTPATIENT)
Dept: INTERNAL MEDICINE | Facility: CLINIC | Age: 50
End: 2025-03-18

## 2025-03-18 ENCOUNTER — OFFICE VISIT (OUTPATIENT)
Dept: INTERNAL MEDICINE | Facility: CLINIC | Age: 50
End: 2025-03-18
Payer: COMMERCIAL

## 2025-03-18 ENCOUNTER — LAB VISIT (OUTPATIENT)
Dept: LAB | Facility: HOSPITAL | Age: 50
End: 2025-03-18
Attending: STUDENT IN AN ORGANIZED HEALTH CARE EDUCATION/TRAINING PROGRAM
Payer: COMMERCIAL

## 2025-03-18 VITALS
TEMPERATURE: 98 F | DIASTOLIC BLOOD PRESSURE: 100 MMHG | SYSTOLIC BLOOD PRESSURE: 148 MMHG | HEIGHT: 61 IN | HEART RATE: 87 BPM | WEIGHT: 129 LBS | BODY MASS INDEX: 24.35 KG/M2 | OXYGEN SATURATION: 99 % | RESPIRATION RATE: 18 BRPM

## 2025-03-18 DIAGNOSIS — R05.2 SUBACUTE COUGH: ICD-10-CM

## 2025-03-18 DIAGNOSIS — I10 HYPERTENSION, UNSPECIFIED TYPE: ICD-10-CM

## 2025-03-18 DIAGNOSIS — E11.9 TYPE 2 DIABETES MELLITUS WITHOUT COMPLICATION, WITHOUT LONG-TERM CURRENT USE OF INSULIN: ICD-10-CM

## 2025-03-18 DIAGNOSIS — Z00.00 PHYSICAL EXAM, ANNUAL: Primary | ICD-10-CM

## 2025-03-18 DIAGNOSIS — Z00.00 PHYSICAL EXAM, ANNUAL: ICD-10-CM

## 2025-03-18 LAB
25(OH)D3+25(OH)D2 SERPL-MCNC: 24 NG/ML (ref 30–96)
ALBUMIN SERPL BCP-MCNC: 4.2 G/DL (ref 3.5–5.2)
ALP SERPL-CCNC: 81 U/L (ref 40–150)
ALT SERPL W/O P-5'-P-CCNC: 16 U/L (ref 10–44)
ANION GAP SERPL CALC-SCNC: 9 MMOL/L (ref 8–16)
AST SERPL-CCNC: 19 U/L (ref 10–40)
BASOPHILS # BLD AUTO: 0.03 K/UL (ref 0–0.2)
BASOPHILS NFR BLD: 0.5 % (ref 0–1.9)
BILIRUB SERPL-MCNC: 0.8 MG/DL (ref 0.1–1)
BUN SERPL-MCNC: 12 MG/DL (ref 6–20)
CALCIUM SERPL-MCNC: 9.8 MG/DL (ref 8.7–10.5)
CHLORIDE SERPL-SCNC: 105 MMOL/L (ref 95–110)
CHOLEST SERPL-MCNC: 132 MG/DL (ref 120–199)
CHOLEST/HDLC SERPL: 2.5 {RATIO} (ref 2–5)
CO2 SERPL-SCNC: 25 MMOL/L (ref 23–29)
CREAT SERPL-MCNC: 0.8 MG/DL (ref 0.5–1.4)
DIFFERENTIAL METHOD BLD: NORMAL
EOSINOPHIL # BLD AUTO: 0.1 K/UL (ref 0–0.5)
EOSINOPHIL NFR BLD: 1.5 % (ref 0–8)
ERYTHROCYTE [DISTWIDTH] IN BLOOD BY AUTOMATED COUNT: 12.4 % (ref 11.5–14.5)
EST. GFR  (NO RACE VARIABLE): >60 ML/MIN/1.73 M^2
ESTIMATED AVG GLUCOSE: 103 MG/DL (ref 68–131)
GLUCOSE SERPL-MCNC: 92 MG/DL (ref 70–110)
HBA1C MFR BLD: 5.2 % (ref 4–5.6)
HCT VFR BLD AUTO: 40.3 % (ref 37–48.5)
HDLC SERPL-MCNC: 52 MG/DL (ref 40–75)
HDLC SERPL: 39.4 % (ref 20–50)
HGB BLD-MCNC: 13.4 G/DL (ref 12–16)
IMM GRANULOCYTES # BLD AUTO: 0.02 K/UL (ref 0–0.04)
IMM GRANULOCYTES NFR BLD AUTO: 0.3 % (ref 0–0.5)
LDLC SERPL CALC-MCNC: 61.4 MG/DL (ref 63–159)
LYMPHOCYTES # BLD AUTO: 1.8 K/UL (ref 1–4.8)
LYMPHOCYTES NFR BLD: 29.9 % (ref 18–48)
MCH RBC QN AUTO: 30 PG (ref 27–31)
MCHC RBC AUTO-ENTMCNC: 33.3 G/DL (ref 32–36)
MCV RBC AUTO: 90 FL (ref 82–98)
MONOCYTES # BLD AUTO: 0.3 K/UL (ref 0.3–1)
MONOCYTES NFR BLD: 4.8 % (ref 4–15)
NEUTROPHILS # BLD AUTO: 3.8 K/UL (ref 1.8–7.7)
NEUTROPHILS NFR BLD: 63 % (ref 38–73)
NONHDLC SERPL-MCNC: 80 MG/DL
NRBC BLD-RTO: 0 /100 WBC
PLATELET # BLD AUTO: 286 K/UL (ref 150–450)
PMV BLD AUTO: 9.7 FL (ref 9.2–12.9)
POTASSIUM SERPL-SCNC: 4.7 MMOL/L (ref 3.5–5.1)
PROT SERPL-MCNC: 7.5 G/DL (ref 6–8.4)
RBC # BLD AUTO: 4.47 M/UL (ref 4–5.4)
SODIUM SERPL-SCNC: 139 MMOL/L (ref 136–145)
TRIGL SERPL-MCNC: 93 MG/DL (ref 30–150)
TSH SERPL DL<=0.005 MIU/L-ACNC: 1.53 UIU/ML (ref 0.4–4)
WBC # BLD AUTO: 5.99 K/UL (ref 3.9–12.7)

## 2025-03-18 PROCEDURE — 99214 OFFICE O/P EST MOD 30 MIN: CPT | Mod: 25,S$GLB,, | Performed by: STUDENT IN AN ORGANIZED HEALTH CARE EDUCATION/TRAINING PROGRAM

## 2025-03-18 PROCEDURE — 99396 PREV VISIT EST AGE 40-64: CPT | Mod: S$GLB,,, | Performed by: STUDENT IN AN ORGANIZED HEALTH CARE EDUCATION/TRAINING PROGRAM

## 2025-03-18 PROCEDURE — 80061 LIPID PANEL: CPT | Performed by: STUDENT IN AN ORGANIZED HEALTH CARE EDUCATION/TRAINING PROGRAM

## 2025-03-18 PROCEDURE — 36415 COLL VENOUS BLD VENIPUNCTURE: CPT | Mod: PO | Performed by: STUDENT IN AN ORGANIZED HEALTH CARE EDUCATION/TRAINING PROGRAM

## 2025-03-18 PROCEDURE — 1159F MED LIST DOCD IN RCRD: CPT | Mod: CPTII,S$GLB,, | Performed by: STUDENT IN AN ORGANIZED HEALTH CARE EDUCATION/TRAINING PROGRAM

## 2025-03-18 PROCEDURE — 99999 PR PBB SHADOW E&M-EST. PATIENT-LVL IV: CPT | Mod: PBBFAC,,, | Performed by: STUDENT IN AN ORGANIZED HEALTH CARE EDUCATION/TRAINING PROGRAM

## 2025-03-18 PROCEDURE — 80053 COMPREHEN METABOLIC PANEL: CPT | Performed by: STUDENT IN AN ORGANIZED HEALTH CARE EDUCATION/TRAINING PROGRAM

## 2025-03-18 PROCEDURE — 85025 COMPLETE CBC W/AUTO DIFF WBC: CPT | Performed by: STUDENT IN AN ORGANIZED HEALTH CARE EDUCATION/TRAINING PROGRAM

## 2025-03-18 PROCEDURE — 3080F DIAST BP >= 90 MM HG: CPT | Mod: CPTII,S$GLB,, | Performed by: STUDENT IN AN ORGANIZED HEALTH CARE EDUCATION/TRAINING PROGRAM

## 2025-03-18 PROCEDURE — 84443 ASSAY THYROID STIM HORMONE: CPT | Performed by: STUDENT IN AN ORGANIZED HEALTH CARE EDUCATION/TRAINING PROGRAM

## 2025-03-18 PROCEDURE — 82306 VITAMIN D 25 HYDROXY: CPT | Performed by: STUDENT IN AN ORGANIZED HEALTH CARE EDUCATION/TRAINING PROGRAM

## 2025-03-18 PROCEDURE — 3077F SYST BP >= 140 MM HG: CPT | Mod: CPTII,S$GLB,, | Performed by: STUDENT IN AN ORGANIZED HEALTH CARE EDUCATION/TRAINING PROGRAM

## 2025-03-18 PROCEDURE — 3008F BODY MASS INDEX DOCD: CPT | Mod: CPTII,S$GLB,, | Performed by: STUDENT IN AN ORGANIZED HEALTH CARE EDUCATION/TRAINING PROGRAM

## 2025-03-18 PROCEDURE — 83036 HEMOGLOBIN GLYCOSYLATED A1C: CPT | Performed by: STUDENT IN AN ORGANIZED HEALTH CARE EDUCATION/TRAINING PROGRAM

## 2025-03-18 RX ORDER — METOPROLOL SUCCINATE 25 MG/1
25 TABLET, EXTENDED RELEASE ORAL DAILY
Qty: 90 TABLET | Refills: 3 | Status: SHIPPED | OUTPATIENT
Start: 2025-03-18 | End: 2026-03-18

## 2025-03-18 RX ORDER — PREDNISONE 20 MG/1
20 TABLET ORAL 2 TIMES DAILY
Qty: 8 TABLET | Refills: 0 | Status: SHIPPED | OUTPATIENT
Start: 2025-03-18 | End: 2025-03-22

## 2025-03-19 ENCOUNTER — PATIENT MESSAGE (OUTPATIENT)
Dept: INTERNAL MEDICINE | Facility: CLINIC | Age: 50
End: 2025-03-19
Payer: COMMERCIAL

## 2025-03-19 NOTE — TELEPHONE ENCOUNTER
Pt asking if they need to take iron and vit d or if they can just take 1 vitamin with both of these

## 2025-03-26 ENCOUNTER — PATIENT MESSAGE (OUTPATIENT)
Dept: INTERNAL MEDICINE | Facility: CLINIC | Age: 50
End: 2025-03-26
Payer: COMMERCIAL

## 2025-04-21 ENCOUNTER — OFFICE VISIT (OUTPATIENT)
Dept: INTERNAL MEDICINE | Facility: CLINIC | Age: 50
End: 2025-04-21
Payer: COMMERCIAL

## 2025-04-21 VITALS
TEMPERATURE: 98 F | HEIGHT: 61 IN | SYSTOLIC BLOOD PRESSURE: 130 MMHG | OXYGEN SATURATION: 99 % | RESPIRATION RATE: 14 BRPM | BODY MASS INDEX: 24.6 KG/M2 | HEART RATE: 72 BPM | WEIGHT: 130.31 LBS | DIASTOLIC BLOOD PRESSURE: 88 MMHG

## 2025-04-21 DIAGNOSIS — I10 PRIMARY HYPERTENSION: Primary | Chronic | ICD-10-CM

## 2025-04-21 PROCEDURE — 3066F NEPHROPATHY DOC TX: CPT | Mod: CPTII,S$GLB,, | Performed by: PHYSICIAN ASSISTANT

## 2025-04-21 PROCEDURE — 99213 OFFICE O/P EST LOW 20 MIN: CPT | Mod: S$GLB,,, | Performed by: PHYSICIAN ASSISTANT

## 2025-04-21 PROCEDURE — 3061F NEG MICROALBUMINURIA REV: CPT | Mod: CPTII,S$GLB,, | Performed by: PHYSICIAN ASSISTANT

## 2025-04-21 PROCEDURE — 3079F DIAST BP 80-89 MM HG: CPT | Mod: CPTII,S$GLB,, | Performed by: PHYSICIAN ASSISTANT

## 2025-04-21 PROCEDURE — 3044F HG A1C LEVEL LT 7.0%: CPT | Mod: CPTII,S$GLB,, | Performed by: PHYSICIAN ASSISTANT

## 2025-04-21 PROCEDURE — 3075F SYST BP GE 130 - 139MM HG: CPT | Mod: CPTII,S$GLB,, | Performed by: PHYSICIAN ASSISTANT

## 2025-04-21 PROCEDURE — 99999 PR PBB SHADOW E&M-EST. PATIENT-LVL IV: CPT | Mod: PBBFAC,,, | Performed by: PHYSICIAN ASSISTANT

## 2025-04-21 PROCEDURE — 1159F MED LIST DOCD IN RCRD: CPT | Mod: CPTII,S$GLB,, | Performed by: PHYSICIAN ASSISTANT

## 2025-04-21 PROCEDURE — 3008F BODY MASS INDEX DOCD: CPT | Mod: CPTII,S$GLB,, | Performed by: PHYSICIAN ASSISTANT

## 2025-04-21 PROCEDURE — 1160F RVW MEDS BY RX/DR IN RCRD: CPT | Mod: CPTII,S$GLB,, | Performed by: PHYSICIAN ASSISTANT

## 2025-04-21 NOTE — PROGRESS NOTES
[facility] Note    Chief Complaint      Chief Complaint   Patient presents with    Follow-up     1 mo blood pressure       History of Present Illness      Ms. Nahed Mcginnis is a 49 y.o. female patient of Dr. Hammonds who presents today for HTN f/u.     Ms. Mcginnis was evaluated by Dr. Hammonds for annual visit 1 month ago, at this appointment her blood pressure was 148/100 with pulse of 87 not currently on any blood pressure medications. Dr. Hammonds restarted her on metoprolol succinate 25 mg to help with blood pressure and tachycardia.  She has been doing well on metoprolol succinate 25 mg, without any adverse effects or significant drop in pulse.  She denies chest pain, SOB/difficulty breathing, back pain, headaches, changes in vision, claudication, swelling.  She attributes some elevation in blood pressure log readings due to her brother passing away last week, and also taking blood pressure during the most stressful part of her morning.         Past Medical History:  Past Medical History:   Diagnosis Date    Abnormal Pap smear of cervix     Diabetes mellitus, type 2     DM (diabetes mellitus)     Hypertension     Hypothyroidism        Past Surgical History:  Past Surgical History:   Procedure Laterality Date     SECTION      COLONOSCOPY      COLONOSCOPY N/A 10/25/2024    Procedure: COLONOSCOPY;  Surgeon: Paola Clements MD;  Location: [address]);  Service: Endoscopy;  Laterality: N/A;  annamarie-pepper-suprep-ozempic  10/18-portal [facility]-  10/18-pre call complete-last ozempic 10/12/24-tb    DILATION AND CURETTAGE OF UTERUS      TONSILLECTOMY         Family History:  family history includes Heart disease in her father; Hypertension in her mother.     Social History:  Social History[1]     Medications:  Encounter Medications[2]    Allergies:  Review of patient's allergies indicates:  No Known Allergies    Health Maintenance:  Health Maintenance   Topic Date Due     "TETANUS VACCINE  06/01/2012    Influenza Vaccine (1) 09/01/2024    COVID-19 Vaccine (4 - 2024-25 season) 09/01/2024    Diabetic Eye Exam  03/20/2025    Foot Exam  05/06/2025    Hemoglobin A1c  09/18/2025    Mammogram  01/07/2026    Diabetes Urine Screening  03/18/2026    Lipid Panel  03/18/2026    Cervical Cancer Screening  12/09/2029    Colorectal Cancer Screening  10/25/2034    RSV Vaccine (Age 60+ and Pregnant patients) (1 - 1-dose 75+ series) 07/22/2052    Hepatitis C Screening  Completed    HIV Screening  Completed    Pneumococcal Vaccines (Age 0-49)  Completed    Low Dose Statin  Discontinued     Health Maintenance Topics with due status: Not Due       Topic Last Completion Date    Colorectal Cancer Screening 10/25/2024    Cervical Cancer Screening 12/09/2024    Mammogram 01/07/2025    Diabetes Urine Screening 03/18/2025    Lipid Panel 03/18/2025    Hemoglobin A1c 03/18/2025    RSV Vaccine (Age 60+ and Pregnant patients) Not Due       Review of Systems:  Review of Systems   Constitutional:  Negative for chills, fever and malaise/fatigue.   Eyes:  Negative for blurred vision and double vision.   Respiratory:  Negative for cough and shortness of breath.    Cardiovascular:  Negative for chest pain, palpitations, claudication and leg swelling.   Gastrointestinal:  Negative for abdominal pain, constipation, diarrhea, heartburn, nausea and vomiting.   Musculoskeletal:  Negative for back pain.   Neurological:  Negative for dizziness, weakness and headaches.       Physical Exam      Vital Signs  Temp: 98.1 °F (36.7 °C)  Temp Source: Temporal  Pulse: 72  Resp: 14  SpO2: 99 %  BP: 130/88  BP Location: Right arm  Patient Position: Sitting  Pain Score: 0-No pain  Height and Weight  Height: 5' 1" (154.9 cm)  Weight: 59.1 kg (130 lb 4.7 oz)  BSA (Calculated - sq m): 1.59 sq meters  BMI (Calculated): 24.6  Weight in (lb) to have BMI = 25: 132]    My repeat BP: 124/86    Physical Exam  Vitals reviewed.   Constitutional:       " Appearance: Normal appearance.   HENT:      Head: Normocephalic and atraumatic.   Eyes:      Extraocular Movements: Extraocular movements intact.      Pupils: Pupils are equal, round, and reactive to light.   Cardiovascular:      Rate and Rhythm: Normal rate.   Pulmonary:      Effort: Pulmonary effort is normal.   Skin:     General: Skin is warm and dry.   Neurological:      General: No focal deficit present.      Mental Status: She is alert.   Psychiatric:         Mood and Affect: Mood normal.          Laboratory:  CBC:  Recent Labs   Lab 04/05/24  1627 04/30/24  0707 03/18/25  0919   WBC 7.56 5.19 5.99   RBC 3.94 L 4.27 4.47   Hemoglobin 10.6 L 11.9 L 13.4   Hematocrit 33.0 L 36.2 L 40.3   Platelets 324 292 286   MCV 84 85 90   MCH 26.9 L 27.9 30.0   MCHC 32.1 32.9 33.3     CMP:  Recent Labs   Lab 03/11/24  1124 04/30/24  0707 03/18/25  0919   Glucose 104 103 92   Calcium 9.1 9.6 9.8   Albumin 3.7 3.8 4.2   Total Protein 6.4 7.1 7.5   Sodium 139 140 139   Potassium 4.3 4.5 4.7   CO2 20 L 21 L 25   Chloride 109 109 105   BUN 12 9 12   Alkaline Phosphatase 55 72 81   ALT 14 13 16   AST 16 16 19   Total Bilirubin 0.4 0.6 0.8     URINALYSIS:  Recent Labs   Lab 03/11/24  1124   Color, UA Yellow   Specific Gravity, UA 1.020   pH, UA 6.0   Protein, UA Negative   Bacteria Rare   Nitrite, UA Negative   Leukocytes, UA 1+ A   Urobilinogen, UA Negative   Hyaline Casts, UA 1      LIPIDS:  Recent Labs   Lab 12/21/22  0633 06/12/23  0705 03/11/24  1124 04/30/24  0707 03/18/25  0919   TSH 3.031   < > 2.122 3.147 1.529   HDL 34 L  --   --  46 52   Cholesterol 124  --   --  117 L 132   Triglycerides 99  --   --  81 93   LDL Cholesterol 70.2  --   --  54.8 L 61.4 L   HDL/Cholesterol Ratio 27.4  --   --  39.3 39.4   Non-HDL Cholesterol 90  --   --  71 80   Total Cholesterol/HDL Ratio 3.6  --   --  2.5 2.5    < > = values in this interval not displayed.     TSH:  Recent Labs   Lab 03/11/24  1124 04/30/24  0707 03/18/25  0919   TSH  2.122 3.147 1.529     A1C:  Recent Labs   Lab 05/16/22  0725 08/22/22  1053 12/21/22  0633 06/12/23  0705 04/30/24  0707 03/18/25  0919   Hemoglobin A1C 7.5 H 6.7 H 5.9 H 5.5 5.6 5.2       Radiology:        Assessment/Plan     Nahed Mcginnis is a 49 y.o.female with:      1. Primary hypertension    Blood pressure has improved significantly since last office visit.  Home readings show great control of systolic blood pressure, slight elevation in diastolic blood pressure, pulse stable.   Continue using metoprolol succinate 25 mg as prescribed.  Encouraged to start taking home readings 1st thing in the morning, instead of during her super active part of the morning.  Return to clinic in 1 month.  Discussed return/seek further evaluation precautions.      Continue current medications and maintain follow up with specialists.  Return to clinic in 1 month for blood pressure follow up.    Nicole Morcos, PA-C [facility]         [1]   Social History  Socioeconomic History    Marital status:    Occupational History    Occupation: clerical     Employer: [employer]   Tobacco Use    Smoking status: Never    Smokeless tobacco: Never   Substance and Sexual Activity    Alcohol use: No    Drug use: No    Sexual activity: Not Currently     Partners: Male     Birth control/protection: OCP     Social Drivers of Health     Financial Resource Strain: Low Risk  (4/29/2024)    Overall Financial Resource Strain (CARDIA)     Difficulty of Paying Living Expenses: Not hard at all   Food Insecurity: No Food Insecurity (4/29/2024)    Hunger Vital Sign     Worried About Running Out of Food in the Last Year: Never true     Ran Out of Food in the Last Year: Never true   Transportation Needs: No Transportation Needs (4/29/2024)    PRAPARE - Transportation     Lack of Transportation (Medical): No     Lack of Transportation (Non-Medical): No   Physical Activity: Insufficiently Active (4/29/2024)    Exercise Vital Sign      Days of Exercise per Week: 4 days     Minutes of Exercise per Session: 30 min   Stress: No Stress Concern Present (4/29/2024)    [place] [employer] - Occupational Stress Questionnaire     Feeling of Stress : Not at all   Housing Stability: Low Risk  (10/26/2022)    Housing Stability Vital Sign     Unable to Pay for Housing in the Last Year: No     Number of Places Lived in the Last Year: 1     Unstable Housing in the Last Year: No   [2]   Outpatient Encounter Medications as of 4/21/2025   Medication Sig Dispense Refill    atorvastatin (LIPITOR) 40 MG tablet Take 1 tablet (40 mg total) by mouth once daily. 90 tablet 1    blood-glucose sensor (FREESTYLE JENNY 3 SENSOR) Trudi 1 Device by Misc.(Non-Drug; Combo Route) route every 14 (fourteen) days. 2 each 11    FEROSUL 325 mg (65 mg iron) Tab tablet TAKE ONE TABLET BY MOUTH DAILY WITH BREAKFAST 90 tablet 1    levothyroxine (SYNTHROID) 50 MCG tablet Take 1 tablet (50 mcg total) by mouth before breakfast. 90 tablet 3    metoprolol succinate (TOPROL-XL) 25 MG 24 hr tablet Take 1 tablet (25 mg total) by mouth once daily. 90 tablet 3    omeprazole (PRILOSEC) 20 MG capsule Take 1 capsule (20 mg total) by mouth once daily. 90 capsule 3    ondansetron (ZOFRAN) 4 MG tablet Take 1 tablet (4 mg total) by mouth every 6 (six) hours. (Patient not taking: Reported on 3/18/2025) 12 tablet 0    semaglutide (OZEMPIC) 2 mg/dose (8 mg/3 mL) PnIj Inject 2 mg into the skin every 7 days. 3 mL 11    TRUE METRIX GLUCOSE TEST STRIP Strp Pt test her blood sugars 4 times a day 200 each 2    [DISCONTINUED] drospirenone, contraceptive, (SLYND) 4 mg (28) Tab Take 1 tablet (4 mg total) by mouth Daily. 84 tablet 3     No facility-administered encounter medications on file as of 4/21/2025.

## 2025-04-22 ENCOUNTER — TELEPHONE (OUTPATIENT)
Dept: DIABETES | Facility: CLINIC | Age: 50
End: 2025-04-22
Payer: COMMERCIAL

## 2025-04-27 ENCOUNTER — TELEPHONE (OUTPATIENT)
Dept: PHARMACY | Facility: CLINIC | Age: 50
End: 2025-04-27
Payer: COMMERCIAL

## 2025-04-28 NOTE — TELEPHONE ENCOUNTER
[facility]/Population Health Chart Review & Patient Outreach Details For Medication Adherence Project    Reason for Outreach Encounter: 3rd Party payor non-compliance report ([employer], [employer], C, etc)  2.  Patient Outreach Method: Reviewed Patient Chart  3.   Medication in question: atorvastatin   LAST FILLED: 4/6/25 for 90 day supply  Hyperlipidemia Medications              atorvastatin (LIPITOR) 40 MG tablet Take 1 tablet (40 mg total) by mouth once daily.               4.  Reviewed and or Updates Made To: Patient Chart  5. Outreach Outcomes and/or actions taken: Patient filled medication and is on track to be adherent

## 2025-05-27 LAB
LEFT EYE DM RETINOPATHY: NEGATIVE
RIGHT EYE DM RETINOPATHY: NEGATIVE

## 2025-05-30 ENCOUNTER — TELEPHONE (OUTPATIENT)
Dept: OBSTETRICS AND GYNECOLOGY | Facility: CLINIC | Age: 50
End: 2025-05-30
Payer: COMMERCIAL

## 2025-05-30 ENCOUNTER — PATIENT MESSAGE (OUTPATIENT)
Dept: OBSTETRICS AND GYNECOLOGY | Facility: CLINIC | Age: 50
End: 2025-05-30
Payer: COMMERCIAL

## 2025-05-30 RX ORDER — NORETHINDRONE 0.35 MG/1
1 TABLET ORAL DAILY
Qty: 30 TABLET | Refills: 11 | Status: SHIPPED | OUTPATIENT
Start: 2025-05-30 | End: 2026-05-30

## 2025-05-30 NOTE — TELEPHONE ENCOUNTER
Ok Dr Estevez said you start the pills the first Sunday after your period. So you should probably wait to start with your next menses. However, Dr Estevez can send in the prescription now so it will be available when you start. Marilou  ===View-only below this line===      ----- Message -----       From:Nahed Mcginnis       Sent:5/30/2025  4:15 PM CDT         To:Patient Medical Advice Request Message List    Subject:Birth control    Stopped a few months ago.  No irregular bleeding since I stopped taking them      ----- Message -----       From:Kaveh Zamarripa       Sent:5/30/2025  4:14 PM CDT         To:Nahed Mcginnis    Subject:Birth control    When did you stop taking the pills? Are you having irregular bleeding?      ----- Message -----       From:Nahed Mcginnis       Sent:5/30/2025  3:54 PM CDT         To:Patient Medical Advice Request Message List    Subject:Birth control    Friday 23rd of may      ----- Message -----       From:Kaveh Zamarripa       Sent:5/30/2025  3:51 PM CDT         To:Nahed Mcginnis    Subject:Birth control    What day?      ----- Message -----       From:Nahed Mcginnis       Sent:5/30/2025  3:48 PM CDT         To:Patient Medical Advice Request Message List    Subject:Birth control    About a week ago, still currently on my period      ----- Message -----       From:Kaveh Zamarripa       Sent:5/30/2025  3:46 PM CDT         To:Nahed Mcginnis    Subject:Birth control    When was the first day of your last period?      ----- Message -----       From:Nahed Mcginnis       Sent:5/30/2025  7:09 AM CDT         To:Conrado Estevez MD    Subject:Birth control    I would like to get back on the synd birth control, can you please send rx to [facility]

## 2025-06-11 ENCOUNTER — PATIENT OUTREACH (OUTPATIENT)
Dept: ADMINISTRATIVE | Facility: HOSPITAL | Age: 50
End: 2025-06-11
Payer: COMMERCIAL

## 2025-06-13 ENCOUNTER — TELEPHONE (OUTPATIENT)
Dept: DIABETES | Facility: CLINIC | Age: 50
End: 2025-06-13
Payer: COMMERCIAL

## 2025-06-16 ENCOUNTER — OFFICE VISIT (OUTPATIENT)
Dept: DIABETES | Facility: CLINIC | Age: 50
End: 2025-06-16
Payer: COMMERCIAL

## 2025-06-16 VITALS
WEIGHT: 133 LBS | OXYGEN SATURATION: 98 % | HEIGHT: 61 IN | HEART RATE: 88 BPM | SYSTOLIC BLOOD PRESSURE: 122 MMHG | BODY MASS INDEX: 25.11 KG/M2 | DIASTOLIC BLOOD PRESSURE: 72 MMHG

## 2025-06-16 DIAGNOSIS — E66.3 OVERWEIGHT (BMI 25.0-29.9): ICD-10-CM

## 2025-06-16 DIAGNOSIS — E11.9 TYPE 2 DIABETES MELLITUS WITHOUT COMPLICATION, WITHOUT LONG-TERM CURRENT USE OF INSULIN: Primary | ICD-10-CM

## 2025-06-16 DIAGNOSIS — E06.3 HYPOTHYROIDISM DUE TO HASHIMOTO'S THYROIDITIS: ICD-10-CM

## 2025-06-16 DIAGNOSIS — I10 PRIMARY HYPERTENSION: Chronic | ICD-10-CM

## 2025-06-16 DIAGNOSIS — Z71.9 HEALTH EDUCATION/COUNSELING: ICD-10-CM

## 2025-06-16 PROCEDURE — 3044F HG A1C LEVEL LT 7.0%: CPT | Mod: CPTII,S$GLB,, | Performed by: NURSE PRACTITIONER

## 2025-06-16 PROCEDURE — 3074F SYST BP LT 130 MM HG: CPT | Mod: CPTII,S$GLB,, | Performed by: NURSE PRACTITIONER

## 2025-06-16 PROCEDURE — 3061F NEG MICROALBUMINURIA REV: CPT | Mod: CPTII,S$GLB,, | Performed by: NURSE PRACTITIONER

## 2025-06-16 PROCEDURE — 3066F NEPHROPATHY DOC TX: CPT | Mod: CPTII,S$GLB,, | Performed by: NURSE PRACTITIONER

## 2025-06-16 PROCEDURE — 1159F MED LIST DOCD IN RCRD: CPT | Mod: CPTII,S$GLB,, | Performed by: NURSE PRACTITIONER

## 2025-06-16 PROCEDURE — 3078F DIAST BP <80 MM HG: CPT | Mod: CPTII,S$GLB,, | Performed by: NURSE PRACTITIONER

## 2025-06-16 PROCEDURE — 95251 CONT GLUC MNTR ANALYSIS I&R: CPT | Mod: S$GLB,,, | Performed by: NURSE PRACTITIONER

## 2025-06-16 PROCEDURE — 99214 OFFICE O/P EST MOD 30 MIN: CPT | Mod: S$GLB,,, | Performed by: NURSE PRACTITIONER

## 2025-06-16 PROCEDURE — 3008F BODY MASS INDEX DOCD: CPT | Mod: CPTII,S$GLB,, | Performed by: NURSE PRACTITIONER

## 2025-06-16 PROCEDURE — 1160F RVW MEDS BY RX/DR IN RCRD: CPT | Mod: CPTII,S$GLB,, | Performed by: NURSE PRACTITIONER

## 2025-06-16 PROCEDURE — 99999 PR PBB SHADOW E&M-EST. PATIENT-LVL IV: CPT | Mod: PBBFAC,,, | Performed by: NURSE PRACTITIONER

## 2025-06-16 RX ORDER — SEMAGLUTIDE 1.34 MG/ML
1 INJECTION, SOLUTION SUBCUTANEOUS
Qty: 3 ML | Refills: 11 | Status: SHIPPED | OUTPATIENT
Start: 2025-06-16 | End: 2026-06-16

## 2025-06-16 NOTE — ASSESSMENT & PLAN NOTE
+ TPO   On LT4 50 mcg daily   TSH WNL    reinforced take by itself on empty stomach, first thing in the morning and wait at least 30-60 minutes to eat, drink, or take other medications.

## 2025-06-16 NOTE — PROGRESS NOTES
Negative for N//GI upset.     Ozempic   Started taking Centrum MVI with Vit D about 3 months ago from PCP.     Not liking the Manoj 3. Error messages, loses signal in the bath.     Trial off Manoj 3?

## 2025-06-16 NOTE — ASSESSMENT & PLAN NOTE
Body mass index is 25.13 kg/m².  Increases insulin resistance.   Discussed DM diet and exercise.

## 2025-06-16 NOTE — ASSESSMENT & PLAN NOTE
Very well controlled   She is open to trying to cut back on the Ozempic today  She would like to come off the freestyle Manoj as it is causing her too much trouble        -- Medication Changes:   Cut back on Ozempic to 1 mg weekly      -- Reviewed goals of therapy are to get the best control we can without hypoglycemia.  -- Reviewed patient's current insulin regimen. Clarified proper insulin dose and timing in relation to meals, etc. Insulin injection sites and proper rotation instructed.    -- Advised frequent self blood glucose monitoring.  Patient encouraged to document glucose results and bring them to every clinic visit.  Come off Manoj 3---causing too much trouble---check blood sugars twice a week at alternating times---discussed if readings are running higher we can put it back on to re-evaluate her on the lower dose of the Ozempic  -- Hypoglycemia precautions discussed. Instructed on precautions before driving.    -- Call for Bg repeatedly < 70 or > 180.   -- Close adherence to lifestyle changes recommended.   -- Periodic follow ups for eye evaluations, foot care and dental care suggested.      She presented with eye records today which was uploaded into the chart

## 2025-06-16 NOTE — PROGRESS NOTES
CC:   Chief Complaint   Patient presents with    Diabetes Mellitus       HPI: Nahed Mcginnis is a 49 y.o. female presents for a follow up visit today for the management of T2DM.    She was diagnosed with Type 2 diabetes around 2019 on routine labs   Prediabetes over 10 years prior to T2DM dx     Family hx of diabetes: brothers, father   Hospitalized for diabetes: denies   Insulin therapy: denies     No personal or FH of thyroid cancer or personal of pancreatic cancer or pancreatitis.         Our last visit was in June 2024 -- here for annual follow up visit   At our last visit we continued her Ozempic 2 mg weekly---we did discuss Mounjaro  We discussed cutting back on the Ozempic if she has issues with nausea or vomiting  She reports today that she is tolerating the Ozempic 2 mg weekly well without any GI upset  She does not like the kalani 3 ---reports that she is having a lot of issues  She would like to come off the Kalani if possible  Her recent A1c is 5.2%  See attached Kalani download  Today she expresses that she would like to cut back on the Ozempic and potentially work her way off of it                      DIABETES COMPLICATIONS: none      Diabetes Management Status    ASA:  No    Statin: Taking-- Lipitor 40 mg   ACE/ARB:  not taking -- off     Screening or Prevention Patient's value Goal Complete/Controlled?   HgA1C Testing and Control   Lab Results   Component Value Date    HGBA1C 5.2 03/18/2025      Annually/Less than 8% Yes     Lipid profile : 03/18/2025 Annually Yes     LDL control Lab Results   Component Value Date    LDLCALC 61.4 (L) 03/18/2025    Annually/Less than 100 mg/dl  Yes     Nephropathy screening Lab Results   Component Value Date    LABMICR 27.0 03/18/2025     Lab Results   Component Value Date    PROTEINUA Negative 03/11/2024    Annually Yes     Blood pressure BP Readings from Last 1 Encounters:   06/16/25 122/72    Less than 140/90 Yes     Dilated retinal exam : 05/27/2025 Annually  Yes     Foot exam   : 2025 Annually Yes         CURRENT A1C:    Hemoglobin A1C   Date Value Ref Range Status   2025 5.2 4.0 - 5.6 % Final     Comment:     ADA Screening Guidelines:  5.7-6.4%  Consistent with prediabetes  >or=6.5%  Consistent with diabetes    High levels of fetal hemoglobin interfere with the HbA1C  assay. Heterozygous hemoglobin variants (HbS, HgC, etc)do  not significantly interfere with this assay.   However, presence of multiple variants may affect accuracy.     2024 5.6 4.0 - 5.6 % Final     Comment:     ADA Screening Guidelines:  5.7-6.4%  Consistent with prediabetes  >or=6.5%  Consistent with diabetes    High levels of fetal hemoglobin interfere with the HbA1C  assay. Heterozygous hemoglobin variants (HbS, HgC, etc)do  not significantly interfere with this assay.   However, presence of multiple variants may affect accuracy.     2023 5.5 4.0 - 5.6 % Final     Comment:     ADA Screening Guidelines:  5.7-6.4%  Consistent with prediabetes  >or=6.5%  Consistent with diabetes    High levels of fetal hemoglobin interfere with the HbA1C  assay. Heterozygous hemoglobin variants (HbS, HgC, etc)do  not significantly interfere with this assay.   However, presence of multiple variants may affect accuracy.         GOAL A1C: 6.5% without hypoglycemia       DM MEDICATIONS USED IN THE PAST: Ozempic   Metformin - gi side effects.   jardiance - yeast infections. Stopped in  -  DPP4 - Januvia  SGLT2 - jardiance - just restarted recently - however has new yeast infection.   Glimepiride 4mg with breakfast.  Ozempic       CURRENT DIABETES MEDICATIONS: Ozempic 2 mg weekly on Saturday AM   Insulin: N/A    Missed doses: denies         BLOOD GLUCOSE MONITORING:    Sensor type: Freestyle kalani 2   Average BG readin  Estimated A1c is 5.7%   Time in range: 99%  1% low   Very low- 0%   0% high   0% very high   Site change:  q14 days.      Sensor was downloaded in clinic today and reviewed  with patient.   Please see attached document for download.        HYPOGLYCEMIA: 1% low   0% very low   Did not confirm with a fingerstick         MEALS: eating 2 meals per day + snacks   BF: coffee and eggs or sometimes skips-- or fruit or something small   Lunch: 1/2 sandwich or grilled chicken or left overs   Dinner: skip usually -- something very light   Snack: rarely -- fruit -  Drinks: water        CURRENT EXERCISE:  gym 3-5 times per week ---has slacked off on the gym the last 2 months      Review of Systems  Review of Systems   Constitutional:  Negative for appetite change, fatigue and unexpected weight change.   HENT:  Negative for trouble swallowing.    Eyes:  Negative for visual disturbance.   Respiratory:  Negative for shortness of breath.    Cardiovascular:  Negative for chest pain.   Gastrointestinal:  Negative for abdominal distention, abdominal pain, constipation, nausea and vomiting.   Endocrine: Negative for polydipsia, polyphagia and polyuria.   Genitourinary:         No Nocturia    Skin:  Negative for wound.   Neurological:  Negative for numbness.         Physical Exam   Physical Exam  Vitals and nursing note reviewed.   Constitutional:       General: She is not in acute distress.     Appearance: Normal appearance. She is well-developed. She is not ill-appearing.      Comments: Overweight female    HENT:      Head: Normocephalic and atraumatic.      Right Ear: External ear normal.      Left Ear: External ear normal.      Nose: Nose normal.   Neck:      Thyroid: No thyromegaly.      Trachea: No tracheal deviation.   Cardiovascular:      Rate and Rhythm: Normal rate and regular rhythm.      Heart sounds: No murmur heard.  Pulmonary:      Effort: Pulmonary effort is normal. No respiratory distress.      Breath sounds: Normal breath sounds.   Abdominal:      Palpations: Abdomen is soft.      Tenderness: There is no abdominal tenderness.      Hernia: No hernia is present.   Musculoskeletal:       Cervical back: Normal range of motion and neck supple.   Skin:     General: Skin is warm and dry.      Capillary Refill: Capillary refill takes less than 2 seconds.      Findings: No rash.      Comments: Injection sites are normal appearing. No lipo hypertropthy or atrophy     Neurological:      Mental Status: She is alert and oriented to person, place, and time.      Cranial Nerves: No cranial nerve deficit.   Psychiatric:         Mood and Affect: Mood normal.         Behavior: Behavior normal.         Thought Content: Thought content normal.         Judgment: Judgment normal.           FOOT EXAMINATION:wearing sandals       Protective Sensation (w/ 10 gram monofilament):  Right: Intact  Left: Intact    Visual Inspection:  Normal -  Bilateral and Nails Intact - without Evidence of Foot Deformity- Bilateral    Pedal Pulses:   Right: Present  Left: Present    Posterior Tibialis Pulses:   Right:Present  Left: Present        Lab Results   Component Value Date    TSH 1.529 03/18/2025           Type 2 diabetes mellitus without complication, without long-term current use of insulin  Very well controlled   She is open to trying to cut back on the Ozempic today  She would like to come off the freestyle Manoj as it is causing her too much trouble        -- Medication Changes:   Cut back on Ozempic to 1 mg weekly      -- Reviewed goals of therapy are to get the best control we can without hypoglycemia.  -- Reviewed patient's current insulin regimen. Clarified proper insulin dose and timing in relation to meals, etc. Insulin injection sites and proper rotation instructed.    -- Advised frequent self blood glucose monitoring.  Patient encouraged to document glucose results and bring them to every clinic visit.  Come off Manoj 3---causing too much trouble---check blood sugars twice a week at alternating times---discussed if readings are running higher we can put it back on to re-evaluate her on the lower dose of the Ozempic  --  Hypoglycemia precautions discussed. Instructed on precautions before driving.    -- Call for Bg repeatedly < 70 or > 180.   -- Close adherence to lifestyle changes recommended.   -- Periodic follow ups for eye evaluations, foot care and dental care suggested.      She presented with eye records today which was uploaded into the chart        HTN (hypertension)  PCP took her off all blood pressure medications  Blood pressure well controlled today  Goal less than 140/90    Hypothyroidism due to Hashimoto's thyroiditis  + TPO   On LT4 50 mcg daily   TSH WNL    reinforced take by itself on empty stomach, first thing in the morning and wait at least 30-60 minutes to eat, drink, or take other medications.            Overweight (BMI 25.0-29.9)  Body mass index is 25.13 kg/m².  Increases insulin resistance.   Discussed DM diet and exercise.           I spent a total of 30 minutes on the day of the visit.This includes face to face time and non-face to face time preparing to see the patient (eg, review of tests), obtaining and/or reviewing separately obtained history, documenting clinical information in the electronic or other health record, independently interpreting results and communicating results to the patient/family/caregiver, or care coordinator.          Follow up in about 6 months (around 12/16/2025).  Follow up with me in 6 months with labs prior           Orders Placed This Encounter   Procedures    Hemoglobin A1C     Standing Status:   Future     Expected Date:   9/16/2025     Expiration Date:   12/16/2026    Basic Metabolic Panel     Standing Status:   Future     Expected Date:   9/16/2025     Expiration Date:   12/16/2026       Recommendations were discussed with the patient in detail  The patient verbalized understanding and agrees with the plan outlined as above.     This note was partly generated with [employer] voice recognition software. I apologize for any possible typographical errors.

## 2025-06-28 RX ORDER — OMEPRAZOLE 20 MG/1
20 CAPSULE, DELAYED RELEASE ORAL DAILY
Qty: 90 CAPSULE | Refills: 2 | Status: SHIPPED | OUTPATIENT
Start: 2025-06-28

## 2025-06-28 RX ORDER — OMEPRAZOLE 20 MG/1
CAPSULE, DELAYED RELEASE ORAL
Qty: 90 CAPSULE | Refills: 3 | OUTPATIENT
Start: 2025-06-28

## 2025-06-28 NOTE — TELEPHONE ENCOUNTER
No care due was identified.  [facility] Embedded Care Due Messages. Reference number: [id number].   6/28/2025 7:21:07 AM CDT

## 2025-06-28 NOTE — TELEPHONE ENCOUNTER
No care due was identified.  [facility] Embedded Care Due Messages. Reference number: [id number].   6/28/2025 7:19:47 AM CDT

## 2025-06-29 NOTE — TELEPHONE ENCOUNTER
Refill Decision Note   Nahed Mcginnis  is requesting a refill authorization.  Brief Assessment and Rationale for Refill:  Approve     Medication Therapy Plan:        Comments:     Note composed:9:07 PM 06/28/2025

## 2025-06-29 NOTE — TELEPHONE ENCOUNTER
Refill Decision Note   Nahed Mcginnis  is requesting a refill authorization.  Brief Assessment and Rationale for Refill:  Quick Discontinue     Medication Therapy Plan:         Comments:     Note composed:9:07 PM 06/28/2025

## 2025-07-12 NOTE — TELEPHONE ENCOUNTER
No care due was identified.  [facility] Embedded Care Due Messages. Reference number: [id number].   7/12/2025 8:20:24 AM CDT

## 2025-07-12 NOTE — TELEPHONE ENCOUNTER
No care due was identified.  [facility] Embedded Care Due Messages. Reference number: [id number].   7/12/2025 8:21:29 AM CDT

## 2025-07-13 RX ORDER — ATORVASTATIN CALCIUM 40 MG/1
40 TABLET, FILM COATED ORAL
Qty: 90 TABLET | Refills: 2 | Status: SHIPPED | OUTPATIENT
Start: 2025-07-13

## 2025-07-14 RX ORDER — ATORVASTATIN CALCIUM 40 MG/1
40 TABLET, FILM COATED ORAL DAILY
Qty: 90 TABLET | Refills: 1 | OUTPATIENT
Start: 2025-07-14

## 2025-07-14 NOTE — TELEPHONE ENCOUNTER
Refill Decision Note   Nahed Mcginnis  is requesting a refill authorization.  Brief Assessment and Rationale for Refill:  Quick Discontinue     Medication Therapy Plan:  E-Prescribing Status: Receipt confirmed by pharmacy (7/13/2025      Comments:     Note composed:8:05 AM 07/14/2025

## 2025-07-14 NOTE — TELEPHONE ENCOUNTER
Refill Decision Note   Nahed Mcginnis  is requesting a refill authorization.  Brief Assessment and Rationale for Refill:  Approve     Medication Therapy Plan:         Comments:     Note composed:7:52 PM 07/13/2025             Appointments     Last Visit   3/18/2025 Fausto Hammonds MD   Next Visit   7/12/2025 Fausto Hammonds MD

## 2025-08-31 ENCOUNTER — PATIENT MESSAGE (OUTPATIENT)
Dept: DIABETES | Facility: CLINIC | Age: 50
End: 2025-08-31
Payer: COMMERCIAL

## 2025-08-31 DIAGNOSIS — E11.9 TYPE 2 DIABETES MELLITUS WITHOUT COMPLICATION, WITHOUT LONG-TERM CURRENT USE OF INSULIN: Primary | ICD-10-CM

## 2025-09-02 RX ORDER — LANCETS
EACH MISCELLANEOUS
Qty: 150 EACH | Refills: 11 | Status: SHIPPED | OUTPATIENT
Start: 2025-09-02